# Patient Record
Sex: FEMALE | Race: WHITE | NOT HISPANIC OR LATINO | Employment: FULL TIME | URBAN - METROPOLITAN AREA
[De-identification: names, ages, dates, MRNs, and addresses within clinical notes are randomized per-mention and may not be internally consistent; named-entity substitution may affect disease eponyms.]

---

## 2017-01-30 ENCOUNTER — ALLSCRIPTS OFFICE VISIT (OUTPATIENT)
Dept: OTHER | Facility: OTHER | Age: 32
End: 2017-01-30

## 2017-10-04 ENCOUNTER — ALLSCRIPTS OFFICE VISIT (OUTPATIENT)
Dept: OTHER | Facility: OTHER | Age: 32
End: 2017-10-04

## 2017-10-06 NOTE — PROGRESS NOTES
Assessment  1  Bacterial skin infection (686 9,041 9) (L08 9,B96 89)    Plan  Bacterial skin infection    · Sulfamethoxazole-Trimethoprim 800-160 MG Oral Tablet; TAKE 1 TABLET TWICE  DAILY UNTIL FINISHED    Discussion/Summary    Start Bactrim as directed  Call or return with problems or concerns  Possible side effects of new medications were reviewed with the patient/guardian today  The treatment plan was reviewed with the patient/guardian  The patient/guardian understands and agrees with the treatment plan      Chief Complaint  RASH ON FACE AND IN RIGHT ARM PIT 3-4 WEEKS      History of Present Illness  HPI: RASH ON FACE AND IN RIGHT ARM PIT 3-4 WEEKSTO HYDROCORTISONE CREAM AND AEROSOL [facility] BURNING, FACE RASH ITCHY      Review of Systems    Constitutional: No fever, no chills, feels well, no tiredness, no recent weight gain or loss  ENT: no ear ache, no loss of hearing, no nosebleeds or nasal discharge, no sore throat or hoarseness  Cardiovascular: no complaints of slow or fast heart rate, no chest pain, no palpitations, no leg claudication or lower extremity edema  Respiratory: no complaints of shortness of breath, no wheezing, no dyspnea on exertion, no orthopnea or PND  Breasts: no complaints of breast pain, breast lump or nipple discharge  Gastrointestinal: no complaints of abdominal pain, no constipation, no nausea or diarrhea, no vomiting, no bloody stools  Genitourinary: no complaints of dysuria, no incontinence, no pelvic pain, no dysmenorrhea, no vaginal discharge or abnormal vaginal bleeding  Musculoskeletal: no complaints of arthralgia, no myalgia, no joint swelling or stiffness, no limb pain or swelling  Integumentary: rash-and-itching, but-as noted in HPI  Neurological: no complaints of headache, no confusion, no numbness or tingling, no dizziness or fainting  ROS reviewed  Active Problems  1  Anxiety (300 00) (F41 9)   2   Fatigue, unspecified type (780 79) (R53 83)   3  Iron deficiency anemia, unspecified iron deficiency anemia type (280 9) (D50 9)   4  Nausea (787 02) (R11 0)   5  Other infective acute otitis externa of left ear (380 10) (D93 736)    Past Medical History  Active Problems And Past Medical History Reviewed: The active problems and past medical history were reviewed and updated today  Family History  Mother    1  No pertinent family history  Family History Reviewed: The family history was reviewed and updated today  Social History   · Never a smoker  The social history was reviewed and updated today  Surgical History  Surgical History Reviewed: The surgical history was reviewed and updated today  Current Meds   1  ALPRAZolam 0 25 MG Oral Tablet; TAKE 1 TABLET TWICE DAILY AS NEEDED; Therapy: [id number] to (Evaluate:14Oct2017); Last Rx:[id number] Ordered   2  BusPIRone HCl - 15 MG Oral Tablet; 1 BID; Therapy: [id number] to (Last Rx:[id number])  Requested for: [id number] Ordered    The medication list was reviewed and updated today  Allergies  1  Percocet TABS    Vitals   Recorded: 55PQB6897 19:92UI   Systolic 510   Diastolic 76   Height 5 ft 1 in   Weight 122 lb    BMI Calculated 23 05   BSA Calculated 1 53     Physical Exam    Constitutional   General appearance: No acute distress, well appearing and well nourished  Pulmonary   Respiratory effort: No increased work of breathing or signs of respiratory distress  Musculoskeletal   Gait and station: Normal     Skin   Skin and subcutaneous tissue: Abnormal  -raised red small patchy rash to right cheek and chin  Folliculitis to right axilla     Psychiatric   Orientation to person, place, and time: Normal     Mood and affect: Normal          Signatures   Electronically signed by : Umu Albright; Oct  4 2017  7:09PM EST                       (Author)    Electronically signed by : Alek Rodas MD; Oct  5 2017  7:38AM EST

## 2017-11-17 ENCOUNTER — HOSPITAL ENCOUNTER (EMERGENCY)
Facility: HOSPITAL | Age: 32
Discharge: HOME | End: 2017-11-17
Attending: EMERGENCY MEDICINE | Admitting: EMERGENCY MEDICINE
Payer: COMMERCIAL

## 2017-11-17 VITALS
WEIGHT: 125 LBS | HEIGHT: 61 IN | BODY MASS INDEX: 23.6 KG/M2 | SYSTOLIC BLOOD PRESSURE: 95 MMHG | HEART RATE: 83 BPM | OXYGEN SATURATION: 100 % | RESPIRATION RATE: 18 BRPM | TEMPERATURE: 98.2 F | DIASTOLIC BLOOD PRESSURE: 51 MMHG

## 2017-11-17 DIAGNOSIS — N10 PYELONEPHRITIS, ACUTE: Primary | ICD-10-CM

## 2017-11-17 LAB
ALBUMIN SERPL BCP-MCNC: 4.1 G/DL (ref 3.5–5)
ALP SERPL-CCNC: 57 U/L (ref 46–116)
ALT SERPL W P-5'-P-CCNC: 23 U/L (ref 12–78)
ANION GAP SERPL CALCULATED.3IONS-SCNC: 10 MMOL/L (ref 4–13)
AST SERPL W P-5'-P-CCNC: 13 U/L (ref 5–45)
BACTERIA UR QL AUTO: ABNORMAL /HPF
BASOPHILS # BLD AUTO: 0.04 THOUSANDS/ΜL (ref 0–0.1)
BASOPHILS NFR BLD AUTO: 0 % (ref 0–1)
BILIRUB SERPL-MCNC: 0.2 MG/DL (ref 0.2–1)
BILIRUB UR QL STRIP: NEGATIVE
BUN SERPL-MCNC: 9 MG/DL (ref 5–25)
CALCIUM SERPL-MCNC: 8.4 MG/DL (ref 8.3–10.1)
CHLORIDE SERPL-SCNC: 104 MMOL/L (ref 100–108)
CLARITY UR: ABNORMAL
CO2 SERPL-SCNC: 26 MMOL/L (ref 21–32)
COLOR UR: YELLOW
CREAT SERPL-MCNC: 0.68 MG/DL (ref 0.6–1.3)
EOSINOPHIL # BLD AUTO: 0.07 THOUSAND/ΜL (ref 0–0.61)
EOSINOPHIL NFR BLD AUTO: 1 % (ref 0–6)
ERYTHROCYTE [DISTWIDTH] IN BLOOD BY AUTOMATED COUNT: 13.3 % (ref 11.6–15.1)
EXT PREG TEST URINE: NEGATIVE
GFR SERPL CREATININE-BSD FRML MDRD: 118 ML/MIN/1.73SQ M
GLUCOSE SERPL-MCNC: 103 MG/DL (ref 65–140)
GLUCOSE UR STRIP-MCNC: NEGATIVE MG/DL
HCT VFR BLD AUTO: 37.4 % (ref 34.8–46.1)
HGB BLD-MCNC: 12.8 G/DL (ref 11.5–15.4)
HGB UR QL STRIP.AUTO: ABNORMAL
KETONES UR STRIP-MCNC: NEGATIVE MG/DL
LEUKOCYTE ESTERASE UR QL STRIP: ABNORMAL
LYMPHOCYTES # BLD AUTO: 1.57 THOUSANDS/ΜL (ref 0.6–4.47)
LYMPHOCYTES NFR BLD AUTO: 13 % (ref 14–44)
MCH RBC QN AUTO: 30.4 PG (ref 26.8–34.3)
MCHC RBC AUTO-ENTMCNC: 34.2 G/DL (ref 31.4–37.4)
MCV RBC AUTO: 89 FL (ref 82–98)
MONOCYTES # BLD AUTO: 0.97 THOUSAND/ΜL (ref 0.17–1.22)
MONOCYTES NFR BLD AUTO: 8 % (ref 4–12)
NEUTROPHILS # BLD AUTO: 9.41 THOUSANDS/ΜL (ref 1.85–7.62)
NEUTS SEG NFR BLD AUTO: 78 % (ref 43–75)
NITRITE UR QL STRIP: NEGATIVE
NON-SQ EPI CELLS URNS QL MICRO: ABNORMAL /HPF
PH UR STRIP.AUTO: 5.5 [PH] (ref 4.5–8)
PLATELET # BLD AUTO: 240 THOUSANDS/UL (ref 149–390)
PMV BLD AUTO: 10.9 FL (ref 8.9–12.7)
POTASSIUM SERPL-SCNC: 3.3 MMOL/L (ref 3.5–5.3)
PROT SERPL-MCNC: 7.3 G/DL (ref 6.4–8.2)
PROT UR STRIP-MCNC: ABNORMAL MG/DL
RBC # BLD AUTO: 4.21 MILLION/UL (ref 3.81–5.12)
RBC #/AREA URNS AUTO: ABNORMAL /HPF
SODIUM SERPL-SCNC: 140 MMOL/L (ref 136–145)
SP GR UR STRIP.AUTO: >=1.03 (ref 1–1.03)
UROBILINOGEN UR QL STRIP.AUTO: 0.2 E.U./DL
WBC # BLD AUTO: 12.06 THOUSAND/UL (ref 4.31–10.16)
WBC #/AREA URNS AUTO: ABNORMAL /HPF

## 2017-11-17 PROCEDURE — 87077 CULTURE AEROBIC IDENTIFY: CPT | Performed by: EMERGENCY MEDICINE

## 2017-11-17 PROCEDURE — 96365 THER/PROPH/DIAG IV INF INIT: CPT

## 2017-11-17 PROCEDURE — 87086 URINE CULTURE/COLONY COUNT: CPT | Performed by: EMERGENCY MEDICINE

## 2017-11-17 PROCEDURE — 81001 URINALYSIS AUTO W/SCOPE: CPT | Performed by: EMERGENCY MEDICINE

## 2017-11-17 PROCEDURE — 87186 SC STD MICRODIL/AGAR DIL: CPT | Performed by: EMERGENCY MEDICINE

## 2017-11-17 PROCEDURE — 96375 TX/PRO/DX INJ NEW DRUG ADDON: CPT

## 2017-11-17 PROCEDURE — 81025 URINE PREGNANCY TEST: CPT | Performed by: EMERGENCY MEDICINE

## 2017-11-17 PROCEDURE — 36415 COLL VENOUS BLD VENIPUNCTURE: CPT | Performed by: EMERGENCY MEDICINE

## 2017-11-17 PROCEDURE — 80053 COMPREHEN METABOLIC PANEL: CPT | Performed by: EMERGENCY MEDICINE

## 2017-11-17 PROCEDURE — 85025 COMPLETE CBC W/AUTO DIFF WBC: CPT | Performed by: EMERGENCY MEDICINE

## 2017-11-17 PROCEDURE — 99284 EMERGENCY DEPT VISIT MOD MDM: CPT

## 2017-11-17 RX ORDER — BUSPIRONE HYDROCHLORIDE 15 MG/1
7.5 TABLET ORAL DAILY
COMMUNITY
Start: 2015-12-31 | End: 2018-03-26 | Stop reason: SDUPTHER

## 2017-11-17 RX ORDER — CIPROFLOXACIN 500 MG/1
500 TABLET, FILM COATED ORAL EVERY 12 HOURS
Qty: 20 TABLET | Refills: 0 | Status: SHIPPED | OUTPATIENT
Start: 2017-11-17 | End: 2017-11-27

## 2017-11-17 RX ORDER — ALPRAZOLAM 0.25 MG/1
0.25 TABLET ORAL 2 TIMES DAILY PRN
COMMUNITY
Start: 2015-10-23 | End: 2018-04-16 | Stop reason: SDUPTHER

## 2017-11-17 RX ORDER — KETOROLAC TROMETHAMINE 30 MG/ML
30 INJECTION, SOLUTION INTRAMUSCULAR; INTRAVENOUS ONCE
Status: COMPLETED | OUTPATIENT
Start: 2017-11-17 | End: 2017-11-17

## 2017-11-17 RX ORDER — CIPROFLOXACIN 2 MG/ML
400 INJECTION, SOLUTION INTRAVENOUS ONCE
Status: COMPLETED | OUTPATIENT
Start: 2017-11-17 | End: 2017-11-17

## 2017-11-17 RX ORDER — ONDANSETRON 2 MG/ML
4 INJECTION INTRAMUSCULAR; INTRAVENOUS ONCE
Status: COMPLETED | OUTPATIENT
Start: 2017-11-17 | End: 2017-11-17

## 2017-11-17 RX ADMIN — SODIUM CHLORIDE 1000 ML: 0.9 INJECTION, SOLUTION INTRAVENOUS at 02:20

## 2017-11-17 RX ADMIN — CIPROFLOXACIN 400 MG: 2 INJECTION, SOLUTION INTRAVENOUS at 02:30

## 2017-11-17 RX ADMIN — KETOROLAC TROMETHAMINE 30 MG: 30 INJECTION, SOLUTION INTRAMUSCULAR at 02:27

## 2017-11-17 RX ADMIN — ONDANSETRON 4 MG: 2 INJECTION INTRAMUSCULAR; INTRAVENOUS at 02:23

## 2017-11-17 NOTE — ED PROVIDER NOTES
History  Chief Complaint   Patient presents with    Flank Pain     Presents with C/O left flank since 10 pm last night  Being treated for UTI for 2 weeks  No history of renal calculi  This is a 29-year-old female complaining of 2 weeks of urinary frequency burning and suprapubic pain and then tonight about 3 hours ago she developed left flank pain similar to when she had a kidney infection in the past   She actually did 5 day course of Bactrim for this recent UTI but that was without any urine sent sample for urinalysis  She describes this left flank pain as a constant ache which is worse with movement  She does not recall any injuries  She did not take any pain medications  No fever or chills  She does have some slight nausea  No diarrhea or constipation  No vomiting  Prior to Admission Medications   Prescriptions Last Dose Informant Patient Reported? Taking? ALPRAZolam (XANAX) 0 25 mg tablet   Yes Yes   Sig: Take 0 25 mg by mouth 2 (two) times a day as needed   busPIRone (BUSPAR) 15 mg tablet   Yes Yes   Sig: Take 7 5 mg by mouth daily      Facility-Administered Medications: None       History reviewed  No pertinent past medical history  Past Surgical History:   Procedure Laterality Date    WISDOM TOOTH EXTRACTION         History reviewed  No pertinent family history  I have reviewed and agree with the history as documented  Social History   Substance Use Topics    Smoking status: Never Smoker    Smokeless tobacco: Never Used    Alcohol use No        Review of Systems   All other systems reviewed and are negative        Physical Exam  ED Triage Vitals [11/17/17 0140]   Temperature Pulse Respirations Blood Pressure SpO2   97 8 °F (36 6 °C) 86 20 123/70 100 %      Temp Source Heart Rate Source Patient Position - Orthostatic VS BP Location FiO2 (%)   Tympanic Monitor Sitting Right arm --      Pain Score       Worst Possible Pain           Orthostatic Vital Signs  Vitals: 11/17/17 0300 11/17/17 0315 11/17/17 0330 11/17/17 0345   BP: 105/62  95/51    Pulse: 73 74 75 83   Patient Position - Orthostatic VS: Lying  Lying        Physical Exam   Constitutional: She is oriented to person, place, and time  She appears well-developed and well-nourished  She appears distressed  HENT:   Mouth/Throat: Oropharynx is clear and moist    Eyes: Conjunctivae and EOM are normal  Pupils are equal, round, and reactive to light  Neck: Normal range of motion  Neck supple  No spinous process tenderness present  Cardiovascular: Normal rate, regular rhythm, normal heart sounds and intact distal pulses  Pulmonary/Chest: Effort normal and breath sounds normal  No respiratory distress  She has no wheezes  Abdominal: Soft  Bowel sounds are normal  She exhibits no distension  There is generalized tenderness  There is CVA tenderness  There is no rebound and no guarding  Musculoskeletal: Normal range of motion  Neurological: She is alert and oriented to person, place, and time  She has normal strength  No sensory deficit  GCS eye subscore is 4  GCS verbal subscore is 5  GCS motor subscore is 6  Skin: Skin is warm and dry  No rash noted  Psychiatric: She has a normal mood and affect  Nursing note and vitals reviewed        ED Medications  Medications   ketorolac (TORADOL) 30 mg/mL injection 30 mg (30 mg Intravenous Given 11/17/17 0227)   ondansetron (ZOFRAN) injection 4 mg (4 mg Intravenous Given 11/17/17 0223)   sodium chloride 0 9 % bolus 1,000 mL (0 mL Intravenous Stopped 11/17/17 0335)   ciprofloxacin (CIPRO) IVPB (premix) 400 mg (0 mg Intravenous Stopped 11/17/17 0335)       Diagnostic Studies  Results Reviewed     Procedure Component Value Units Date/Time    Urine Microscopic [[id number]]  (Abnormal) Collected:  11/17/17 0155    Lab Status:  Final result Specimen:  Urine from Urine, Clean Catch Updated:  11/17/17 0254     RBC, UA 20-30 (A) /hpf      WBC, UA 10-20 (A) /hpf      Epithelial Cells Occasional /hpf      Bacteria, UA Moderate (A) /hpf     Urine culture [[id number]] Collected:  11/17/17 0155    Lab Status: In process Specimen:  Urine from Urine, Clean Catch Updated:  11/17/17 0254    CMP [[id number]]  (Abnormal) Collected:  11/17/17 0210    Lab Status:  Final result Specimen:  Blood from Line, Venous Updated:  11/17/17 0243     Sodium 140 mmol/L      Potassium 3 3 (L) mmol/L      Chloride 104 mmol/L      CO2 26 mmol/L      Anion Gap 10 mmol/L      BUN 9 mg/dL      Creatinine 0 68 mg/dL      Glucose 103 mg/dL      Calcium 8 4 mg/dL      AST 13 U/L      ALT 23 U/L      Alkaline Phosphatase 57 U/L      Total Protein 7 3 g/dL      Albumin 4 1 g/dL      Total Bilirubin 0 20 mg/dL      eGFR 118 ml/min/1 73sq m     Narrative:         National Kidney Disease Education Program recommendations are as follows:  GFR calculation is accurate only with a steady state creatinine  Chronic Kidney disease less than 60 ml/min/1 73 sq  meters  Kidney failure less than 15 ml/min/1 73 sq  meters      UA w Reflex to Microscopic w Reflex to Culture [[id number]]  (Abnormal) Collected:  11/17/17 0155    Lab Status:  Final result Specimen:  Urine from Urine, Clean Catch Updated:  11/17/17 0234     Color, UA Yellow     Clarity, UA Turbid     Specific Gravity, UA >=1 030     pH, UA 5 5     Leukocytes, UA Moderate (A)     Nitrite, UA Negative     Protein,  (2+) (A) mg/dl      Glucose, UA Negative mg/dl      Ketones, UA Negative mg/dl      Urobilinogen, UA 0 2 E U /dl      Bilirubin, UA Negative     Blood, UA Large (A)    CBC and differential [[id number]]  (Abnormal) Collected:  11/17/17 0210    Lab Status:  Final result Specimen:  Blood from Line, Venous Updated:  11/17/17 0234     WBC 12 06 (H) Thousand/uL      RBC 4 21 Million/uL      Hemoglobin 12 8 g/dL      Hematocrit 37 4 %      MCV 89 fL      MCH 30 4 pg      MCHC 34 2 g/dL      RDW 13 3 %      MPV 10 9 fL      Platelets 676 Thousands/uL      Neutrophils Relative 78 (H) % Lymphocytes Relative 13 (L) %      Monocytes Relative 8 %      Eosinophils Relative 1 %      Basophils Relative 0 %      Neutrophils Absolute 9 41 (H) Thousands/µL      Lymphocytes Absolute 1 57 Thousands/µL      Monocytes Absolute 0 97 Thousand/µL      Eosinophils Absolute 0 07 Thousand/µL      Basophils Absolute 0 04 Thousands/µL     POCT pregnancy, urine [[id number]]  (Normal) Resulted:  11/17/17 0155    Lab Status:  Final result Updated:  11/17/17 0201     EXT PREG TEST UR (Ref: Negative) negative                 No orders to display              Procedures  Procedures       Phone Contacts  ED Phone Contact    ED Course  ED Course                                MDM  Number of Diagnoses or Management Options  Pyelonephritis, acute: new and requires workup     Amount and/or Complexity of Data Reviewed  Clinical lab tests: ordered and reviewed    Patient Progress  Patient progress: improved    CritCare Time    Disposition  Final diagnoses:   Pyelonephritis, acute - left side     Time reflects when diagnosis was documented in both MDM as applicable and the Disposition within this note     Time User Action Codes Description Comment    11/17/2017  3:10 AM Ezra Allen Add [N10] Pyelonephritis, acute     11/17/2017  3:10 AM Ezra Allen Modify [N10] Pyelonephritis, acute left side      ED Disposition     ED Disposition Condition Comment    Discharge  Shadia Dalton discharge to home/self care  Condition at discharge: Good        Follow-up Information     Follow up With Specialties Details Why Contact Info    Jose Adhikari MD Family Medicine  As needed [address] [facility]  [address] [address]  [address]          Discharge Medication List as of 11/17/2017  3:11 AM      START taking these medications    Details   ciprofloxacin (CIPRO) 500 mg tablet Take 1 tablet by mouth every 12 (twelve) hours for 10 days, Starting Fri 11/17/2017, Until Mon 11/27/2017, Print           No discharge procedures on file      ED Provider  Electronically Signed by           Edith España DO  11/17/17 94

## 2017-11-17 NOTE — DISCHARGE INSTRUCTIONS
Kidney Infection   WHAT YOU NEED TO KNOW:   A kidney infection, or pyelonephritis, is a bacterial infection  The infection usually starts in your bladder or urethra and moves into your kidney  One or both kidneys may be infected  DISCHARGE INSTRUCTIONS:   Seek care immediately if:   · You have a fever and chills  · You cannot stop vomiting  · You have severe pain in your abdomen, lower back, or sides  Contact your healthcare provider if:   · You continue to have a fever after you take antibiotics for 3 days  · You have pain when you urinate, even after treatment  · Your signs and symptoms return  · You have questions or concerns about your condition or care  Medicines: You may  need any of the following:  · Antibiotics  treat your bacterial infection  · Acetaminophen  decreases pain and fever  It is available without a doctor's order  Ask how much to take and how often to take it  Follow directions  Read the labels of all other medicines you are using to see if they also contain acetaminophen, or ask your doctor or pharmacist  Acetaminophen can cause liver damage if not taken correctly  Do not use more than 4 grams (4,000 milligrams) total of acetaminophen in one day  · NSAIDs , such as ibuprofen, help decrease swelling, pain, and fever  This medicine is available with or without a doctor's order  NSAIDs can cause stomach bleeding or kidney problems in certain people  If you take blood thinner medicine, always ask if NSAIDs are safe for you  Always read the medicine label and follow directions  Do not give these medicines to children under 10months of age without direction from your child's healthcare provider  · Prescription pain medicine  may be given  Ask how to take this medicine safely  · Take your medicine as directed  Contact your healthcare provider if you think your medicine is not helping or if you have side effects   Tell him of her if you are allergic to any medicine  Keep a list of the medicines, vitamins, and herbs you take  Include the amounts, and when and why you take them  Bring the list or the pill bottles to follow-up visits  Carry your medicine list with you in case of an emergency  Drink liquids as directed: You may need to drink extra liquids to help flush your kidneys and urinary system  Water is the best liquid to drink  Ask your healthcare provider how much liquid to drink each day and which liquids are best for you  Urinate as soon as you feel the urge: This will help flush bacteria from your urinary system  Do not wait or hold your urine for too long  Clean your genital area every day with soap and water:  Wipe from front to back after you urinate or have a bowel movement  Wear cotton underwear  Fabrics such as nylon and polyester can stay damp  This can increase your risk for infection  Urinate within 15 minutes after you have sex  Follow up with your healthcare provider as directed:  Write down your questions so you remember to ask them during your visits  © 2017 [facility] [place] Information is for End User's use only and may not be sold, redistributed or otherwise used for commercial purposes  All illustrations and images included in CareNotes® are the copyrighted property of A D A M , Inc  or Eldon Rodriguez  The above information is an  only  It is not intended as medical advice for individual conditions or treatments  Talk to your doctor, nurse or pharmacist before following any medical regimen to see if it is safe and effective for you

## 2017-11-19 LAB — BACTERIA UR CULT: ABNORMAL

## 2018-01-09 ENCOUNTER — GENERIC CONVERSION - ENCOUNTER (OUTPATIENT)
Dept: FAMILY MEDICINE CLINIC | Facility: CLINIC | Age: 33
End: 2018-01-09

## 2018-01-10 NOTE — RESULT NOTES
Verified Results  (1) COMPREHENSIVE METABOLIC PANEL [id number] 02:09AO [facility]     Test Name Result Flag Reference   Glucose, Serum 92 mg/dL  65-99   BUN 12 mg/dL  6-20   Creatinine, Serum 0 64 mg/dL  0 57-1 00   eGFR If NonAfricn Am 122 mL/min/1 73  >59   eGFR If Africn Am 140 mL/min/1 73  >59   BUN/Creatinine Ratio 19  8-20   Sodium, Serum 139 mmol/L  134-144   Potassium, Serum 4 0 mmol/L  3 5-5 2   Chloride, Serum 100 mmol/L     Carbon Dioxide, Total 21 mmol/L  18-29   Calcium, Serum 9 0 mg/dL  8 7-10 2   Protein, Total, Serum 7 6 g/dL  6 0-8 5   Albumin, Serum 5 0 g/dL  3 5-5 5   Globulin, Total 2 6 g/dL  1 5-4 5   A/G Ratio 1 9  1 1-2 5   Bilirubin, Total 0 4 mg/dL  0 0-1 2   Alkaline Phosphatase, S 48 IU/L     AST (SGOT) 13 IU/L  0-40   ALT (SGPT) 12 IU/L  0-32     (1) TSH [id number] 08:47AM [employer]     Test Name Result Flag Reference   TSH 3 690 uIU/mL  0 450-4 500     (1) FERRITIN [id number] 08:47AM [place] [facility]     Test Name Result Flag Reference   Ferritin, Serum 58 ng/mL       (1) CBC/PLT/DIFF [id number] 08:47AM Viola Nelly     Test Name Result Flag Reference   WBC 4 6 x10E3/uL  3 4-10 8   RBC 4 81 x10E6/uL  3 77-5 28   Hemoglobin 14 5 g/dL  11 1-15 9   Hematocrit 42 5 %  34 0-46  6   MCV 88 fL  79-97   MCH 30 1 pg  26 6-33 0   MCHC 34 1 g/dL  31 5-35 7   RDW 14 4 %  12 3-15 4   Platelets 229 E25F2/IC  150-379   Neutrophils 60 %     Lymphs 31 %     Monocytes 7 %     Eos 1 %     Basos 1 %     Neutrophils (Absolute) 2 8 x10E3/uL  1 4-7 0   Lymphs (Absolute) 1 4 x10E3/uL  0 7-3 1   Monocytes(Absolute) 0 3 x10E3/uL  0 1-0 9   Eos (Absolute) 0 1 x10E3/uL  0 0-0 4   Baso (Absolute) 0 0 x10E3/uL  0 0-0 2   Immature Granulocytes 0 %     Immature Grans (Abs) 0 0 x10E3/uL  0 0-0 1

## 2018-01-13 VITALS
DIASTOLIC BLOOD PRESSURE: 60 MMHG | BODY MASS INDEX: 23.98 KG/M2 | WEIGHT: 127 LBS | HEIGHT: 61 IN | SYSTOLIC BLOOD PRESSURE: 112 MMHG | OXYGEN SATURATION: 99 % | HEART RATE: 76 BPM

## 2018-01-14 VITALS
WEIGHT: 122 LBS | BODY MASS INDEX: 23.03 KG/M2 | SYSTOLIC BLOOD PRESSURE: 112 MMHG | DIASTOLIC BLOOD PRESSURE: 76 MMHG | HEIGHT: 61 IN

## 2018-03-26 DIAGNOSIS — F41.9 ANXIETY: Primary | ICD-10-CM

## 2018-03-26 RX ORDER — BUSPIRONE HYDROCHLORIDE 15 MG/1
15 TABLET ORAL 2 TIMES DAILY
Qty: 60 TABLET | Refills: 0 | Status: SHIPPED | OUTPATIENT
Start: 2018-03-26 | End: 2018-04-16 | Stop reason: SDUPTHER

## 2018-04-16 ENCOUNTER — OFFICE VISIT (OUTPATIENT)
Dept: FAMILY MEDICINE CLINIC | Facility: CLINIC | Age: 33
End: 2018-04-16
Payer: COMMERCIAL

## 2018-04-16 VITALS
WEIGHT: 122 LBS | BODY MASS INDEX: 22.45 KG/M2 | DIASTOLIC BLOOD PRESSURE: 70 MMHG | SYSTOLIC BLOOD PRESSURE: 118 MMHG | HEART RATE: 76 BPM | HEIGHT: 62 IN

## 2018-04-16 DIAGNOSIS — F41.9 ANXIETY: Primary | ICD-10-CM

## 2018-04-16 DIAGNOSIS — Z00.00 WELLNESS EXAMINATION: ICD-10-CM

## 2018-04-16 PROCEDURE — 99395 PREV VISIT EST AGE 18-39: CPT | Performed by: FAMILY MEDICINE

## 2018-04-16 PROCEDURE — 99213 OFFICE O/P EST LOW 20 MIN: CPT | Performed by: FAMILY MEDICINE

## 2018-04-16 PROCEDURE — 1036F TOBACCO NON-USER: CPT | Performed by: FAMILY MEDICINE

## 2018-04-16 PROCEDURE — 3008F BODY MASS INDEX DOCD: CPT | Performed by: FAMILY MEDICINE

## 2018-04-16 RX ORDER — BUSPIRONE HYDROCHLORIDE 15 MG/1
15 TABLET ORAL 2 TIMES DAILY
Qty: 180 TABLET | Refills: 3 | Status: SHIPPED | OUTPATIENT
Start: 2018-04-16 | End: 2019-03-18 | Stop reason: SDUPTHER

## 2018-04-16 RX ORDER — ALPRAZOLAM 0.25 MG/1
0.25 TABLET ORAL 2 TIMES DAILY PRN
Qty: 30 TABLET | Refills: 5 | Status: SHIPPED | OUTPATIENT
Start: 2018-04-16 | End: 2019-03-18 | Stop reason: SDUPTHER

## 2018-04-16 NOTE — PROGRESS NOTES
[address] [facility]        NAME: Astrid Leiva is a 29 y o  female  : 1985    MRN: [id number]  DATE: 2018  TIME: 7:36 AM    Assessment and Plan   Anxiety [F41 9]  1  Anxiety  busPIRone (BUSPAR) 15 mg tablet    ALPRAZolam (XANAX) 0 25 mg tablet         Patient Instructions     There are no Patient Instructions on file for this visit  Chief Complaint     Chief Complaint   Patient presents with    Follow-up     Flori Valenzuela         History of Present Illness       anx--stable        Review of Systems   Review of Systems   Constitutional: Negative for appetite change, chills, diaphoresis and fever  HENT: Negative for ear pain, rhinorrhea, sinus pressure and sore throat  Eyes: Negative for discharge, redness and itching  Respiratory: Negative for cough, shortness of breath and wheezing  Cardiovascular: Negative for chest pain and palpitations  Rapid or slow heart rate   Gastrointestinal: Negative for abdominal pain, diarrhea, nausea and vomiting           Current Medications       Current Outpatient Prescriptions:     ALPRAZolam (XANAX) 0 25 mg tablet, Take 1 tablet (0 25 mg total) by mouth 2 (two) times a day as needed for anxiety, Disp: 30 tablet, Rfl: 5    busPIRone (BUSPAR) 15 mg tablet, Take 1 tablet (15 mg total) by mouth 2 (two) times a day, Disp: 180 tablet, Rfl: 3    Current Allergies     Allergies as of 2018 - Reviewed 2018   Allergen Reaction Noted    Oxycodone-acetaminophen  2016    Vicodin [hydrocodone-acetaminophen] Rash 2017            The following portions of the patient's history were reviewed and updated as appropriate: allergies, current medications, past family history, past medical history, past social history, past surgical history and problem list      Past Medical History:   Diagnosis Date    Anxiety        Past Surgical History:   Procedure Laterality Date    WISDOM TOOTH EXTRACTION Family History   Problem Relation Age of Onset    No Known Problems Mother          Medications have been verified  Objective   /70   Pulse 76   Ht 5' 2" (1 575 m)   Wt 55 3 kg (122 lb)   LMP  (LMP Unknown) Comment: MIRENA  BMI 22 31 kg/m²        Physical Exam     Physical Exam   Constitutional: She appears well-developed and well-nourished  HENT:   Right Ear: Tympanic membrane, external ear and ear canal normal  Tympanic membrane is not injected  Left Ear: Tympanic membrane, external ear and ear canal normal  Tympanic membrane is not injected  Nose: Nose normal    Mouth/Throat: Oropharynx is clear and moist and mucous membranes are normal    Eyes: Conjunctivae are normal  Pupils are equal, round, and reactive to light  Neck: Normal range of motion  Neck supple  No thyromegaly present  Cardiovascular: Normal rate, regular rhythm, normal heart sounds and intact distal pulses  Pulmonary/Chest: Effort normal and breath sounds normal  She has no wheezes  Nursing note and vitals reviewed

## 2018-04-16 NOTE — PROGRESS NOTES
HPI:  Renetta De Leon is a 29 y o  female here for her yearly health maintenance exam    Patient Active Problem List   Diagnosis    Anxiety     Past Medical History:   Diagnosis Date    Anxiety        1  Advanced Directive: n     2  Durable Power of  for Healthcare: n     3  Social History:           Drug and alcohol History: n                  4  General Health: good              Immunizations up to date:y                 Lifestyle:                           Healthy Diety                          Alcohol Use:n                          Tobacco Use:n                          Regular exercise:y                          Weight concerns:n                               5  Over the past 2 weeks, how often have you been bothered by the following:              Little interest or pleasure in doing things:n              Felling down, depressed or hopeless:n       Current Outpatient Prescriptions   Medication Sig Dispense Refill    ALPRAZolam (XANAX) 0 25 mg tablet Take 1 tablet (0 25 mg total) by mouth 2 (two) times a day as needed for anxiety 30 tablet 5    busPIRone (BUSPAR) 15 mg tablet Take 1 tablet (15 mg total) by mouth 2 (two) times a day 180 tablet 3     No current facility-administered medications for this visit  Allergies   Allergen Reactions    Oxycodone-Acetaminophen     Vicodin [Hydrocodone-Acetaminophen] Rash     Immunization History   Administered Date(s) Administered    Hep B, adult 06/30/1998, 08/20/1998, 08/27/1999    Tdap 06/16/2011       Patient Care Team:  Ludmila Jones MD as PCP - General      Physical Exam :  Physical Exam     Reviewed Updated [facility] Prior Wellness Visits:   Last Health Maintenance visit information was reviewed, patient interviewed , no change since last HM visit yes  Last HM visit information was reviewed, patient interviewed and updates made to the record today yes    Assessment and Plan:  1   Anxiety  busPIRone (BUSPAR) 15 mg tablet    ALPRAZolam Shayne Bernal 0 25 mg tablet       Health Maintenance Due   Topic Date Due    HIV SCREENING  1985    Depression Screening PHQ-9  08/26/1999    INFLUENZA VACCINE  09/01/2017

## 2019-03-18 ENCOUNTER — OFFICE VISIT (OUTPATIENT)
Dept: FAMILY MEDICINE CLINIC | Facility: CLINIC | Age: 34
End: 2019-03-18

## 2019-03-18 VITALS
HEIGHT: 62 IN | HEART RATE: 88 BPM | SYSTOLIC BLOOD PRESSURE: 116 MMHG | DIASTOLIC BLOOD PRESSURE: 72 MMHG | WEIGHT: 124 LBS | OXYGEN SATURATION: 98 % | BODY MASS INDEX: 22.82 KG/M2

## 2019-03-18 DIAGNOSIS — F41.9 ANXIETY: ICD-10-CM

## 2019-03-18 DIAGNOSIS — Z00.00 WELL ADULT EXAM: Primary | ICD-10-CM

## 2019-03-18 PROCEDURE — 99213 OFFICE O/P EST LOW 20 MIN: CPT | Performed by: NURSE PRACTITIONER

## 2019-03-18 RX ORDER — ALPRAZOLAM 0.25 MG/1
0.25 TABLET ORAL 2 TIMES DAILY PRN
Qty: 30 TABLET | Refills: 5 | Status: SHIPPED | OUTPATIENT
Start: 2019-03-18 | End: 2020-04-08 | Stop reason: SDUPTHER

## 2019-03-18 RX ORDER — BUSPIRONE HYDROCHLORIDE 15 MG/1
15 TABLET ORAL 2 TIMES DAILY
Qty: 180 TABLET | Refills: 3 | Status: SHIPPED | OUTPATIENT
Start: 2019-03-18 | End: 2019-12-18 | Stop reason: ALTCHOICE

## 2019-03-18 NOTE — PROGRESS NOTES
[facility]        NAME: Janak Rose is a 34 y o  female  : 1985    MRN: [id number]  DATE: 2019  TIME: 2:27 PM    Assessment and Plan   Well adult exam [Z00 00]  1  Well adult exam     2  Anxiety  ALPRAZolam (XANAX) 0 25 mg tablet    busPIRone (BUSPAR) 15 mg tablet         Patient Instructions     Patient Instructions   Work Physical form completed  Buspar and Xanax refilled-take as directed  Call/return with any problems or concerns          Chief Complaint     Chief Complaint   Patient presents with    Physical Exam         History of Present Illness         Here for work PE, and needs medication refill for Buspar and Xanax  No issues or complaints  Review of Systems   Review of Systems   Constitutional: Negative for activity change, diaphoresis, fatigue and fever  HENT: Negative for congestion, facial swelling, hearing loss, rhinorrhea, sinus pressure, sinus pain, sneezing, sore throat and voice change  Eyes: Negative for discharge and visual disturbance  Respiratory: Negative for cough, choking, chest tightness, shortness of breath, wheezing and stridor  Cardiovascular: Negative for chest pain, palpitations and leg swelling  Gastrointestinal: Negative for abdominal distention, abdominal pain, constipation, diarrhea, nausea and vomiting  Endocrine: Negative for polydipsia, polyphagia and polyuria  Genitourinary: Negative for difficulty urinating, dysuria, frequency and urgency  Musculoskeletal: Negative for arthralgias, back pain, gait problem, joint swelling, myalgias, neck pain and neck stiffness  Skin: Negative for color change, rash and wound  Neurological: Negative for dizziness, syncope, speech difficulty, weakness, light-headedness and headaches  Hematological: Negative for adenopathy  Does not bruise/bleed easily     Psychiatric/Behavioral: Negative for agitation, behavioral problems, confusion, hallucinations, sleep disturbance and suicidal ideas  The patient is not nervous/anxious  Current Medications       Current Outpatient Medications:     ALPRAZolam (XANAX) 0 25 mg tablet, Take 1 tablet (0 25 mg total) by mouth 2 (two) times a day as needed for anxiety, Disp: 30 tablet, Rfl: 5    busPIRone (BUSPAR) 15 mg tablet, Take 1 tablet (15 mg total) by mouth 2 (two) times a day, Disp: 180 tablet, Rfl: 3    Current Allergies     Allergies as of 03/18/2019 - Reviewed 03/18/2019   Allergen Reaction Noted    Oxycodone-acetaminophen  03/03/2016    Vicodin [hydrocodone-acetaminophen] Rash 11/17/2017            The following portions of the patient's history were reviewed and updated as appropriate: allergies, current medications, past family history, past medical history, past social history, past surgical history and problem list      Past Medical History:   Diagnosis Date    Anxiety        Past Surgical History:   Procedure Laterality Date    WISDOM TOOTH EXTRACTION         Family History   Problem Relation Age of Onset    No Known Problems Mother          Medications have been verified  Objective   /72   Pulse 88   Ht 5' 2" (1 575 m)   Wt 56 2 kg (124 lb)   SpO2 98%   BMI 22 68 kg/m²        Physical Exam     Physical Exam   Constitutional: She is oriented to person, place, and time  She appears well-developed and well-nourished  No distress  HENT:   Head: Normocephalic and atraumatic  Right Ear: Tympanic membrane, external ear and ear canal normal    Left Ear: Tympanic membrane, external ear and ear canal normal    Nose: Nose normal  Right sinus exhibits no maxillary sinus tenderness and no frontal sinus tenderness  Left sinus exhibits no maxillary sinus tenderness and no frontal sinus tenderness  Mouth/Throat: Uvula is midline, oropharynx is clear and moist and mucous membranes are normal  No oropharyngeal exudate  Eyes: Pupils are equal, round, and reactive to light   Conjunctivae and EOM are normal  Right eye exhibits no discharge  Left eye exhibits no discharge  Neck: Normal range of motion  Neck supple  No tracheal deviation present  No thyromegaly present  Cardiovascular: Normal rate, regular rhythm and normal heart sounds  Exam reveals no gallop and no friction rub  No murmur heard  Pulmonary/Chest: Effort normal and breath sounds normal  No respiratory distress  She has no wheezes  She has no rales  Abdominal: Soft  Bowel sounds are normal  She exhibits no distension and no mass  There is no tenderness  There is no rebound and no guarding  Musculoskeletal: Normal range of motion  She exhibits no edema, tenderness or deformity  Lymphadenopathy:     She has no cervical adenopathy  Neurological: She is alert and oriented to person, place, and time  No cranial nerve deficit  Coordination normal    Skin: Skin is warm, dry and intact  No rash noted  She is not diaphoretic  No erythema  Psychiatric: She has a normal mood and affect  Her speech is normal and behavior is normal  Judgment and thought content normal  Cognition and memory are normal    Nursing note and vitals reviewed

## 2019-03-18 NOTE — PATIENT INSTRUCTIONS
Work Physical form completed  Buspar and Xanax refilled-take as directed  Call/return with any problems or concerns

## 2019-07-03 DIAGNOSIS — F41.9 ANXIETY: ICD-10-CM

## 2019-07-03 RX ORDER — ALPRAZOLAM 0.25 MG/1
0.25 TABLET ORAL 2 TIMES DAILY PRN
Qty: 30 TABLET | Refills: 5 | Status: CANCELLED | OUTPATIENT
Start: 2019-07-03

## 2019-07-22 ENCOUNTER — TELEPHONE (OUTPATIENT)
Dept: FAMILY MEDICINE CLINIC | Facility: CLINIC | Age: 34
End: 2019-07-22

## 2019-07-22 DIAGNOSIS — G43.901 MIGRAINE WITH STATUS MIGRAINOSUS, NOT INTRACTABLE, UNSPECIFIED MIGRAINE TYPE: Primary | ICD-10-CM

## 2019-07-23 RX ORDER — SUMATRIPTAN 100 MG/1
100 TABLET, FILM COATED ORAL ONCE AS NEEDED
Qty: 12 TABLET | Refills: 5 | Status: SHIPPED | OUTPATIENT
Start: 2019-07-23 | End: 2020-10-23 | Stop reason: DRUGHIGH

## 2019-10-01 ENCOUNTER — TELEPHONE (OUTPATIENT)
Dept: FAMILY MEDICINE CLINIC | Facility: CLINIC | Age: 34
End: 2019-10-01

## 2019-10-01 DIAGNOSIS — R11.2 NAUSEA AND VOMITING, INTRACTABILITY OF VOMITING NOT SPECIFIED, UNSPECIFIED VOMITING TYPE: Primary | ICD-10-CM

## 2019-10-01 RX ORDER — ONDANSETRON 4 MG/1
4 TABLET, FILM COATED ORAL EVERY 8 HOURS PRN
Qty: 20 TABLET | Refills: 0 | Status: SHIPPED | OUTPATIENT
Start: 2019-10-01 | End: 2019-12-18 | Stop reason: ALTCHOICE

## 2019-12-18 ENCOUNTER — OFFICE VISIT (OUTPATIENT)
Dept: FAMILY MEDICINE CLINIC | Facility: CLINIC | Age: 34
End: 2019-12-18

## 2019-12-18 VITALS
OXYGEN SATURATION: 98 % | TEMPERATURE: 97.9 F | WEIGHT: 126 LBS | HEIGHT: 64 IN | HEART RATE: 73 BPM | BODY MASS INDEX: 21.51 KG/M2

## 2019-12-18 DIAGNOSIS — J20.8 ACUTE BRONCHITIS DUE TO OTHER SPECIFIED ORGANISMS: Primary | ICD-10-CM

## 2019-12-18 PROCEDURE — 99213 OFFICE O/P EST LOW 20 MIN: CPT | Performed by: FAMILY MEDICINE

## 2019-12-18 PROCEDURE — 1036F TOBACCO NON-USER: CPT | Performed by: FAMILY MEDICINE

## 2019-12-18 PROCEDURE — 3008F BODY MASS INDEX DOCD: CPT | Performed by: FAMILY MEDICINE

## 2019-12-18 RX ORDER — BENZONATATE 200 MG/1
200 CAPSULE ORAL 3 TIMES DAILY PRN
Qty: 20 CAPSULE | Refills: 0 | Status: SHIPPED | OUTPATIENT
Start: 2019-12-18 | End: 2021-04-26 | Stop reason: ALTCHOICE

## 2019-12-18 RX ORDER — PREDNISONE 20 MG/1
TABLET ORAL
Qty: 15 TABLET | Refills: 0 | Status: SHIPPED | OUTPATIENT
Start: 2019-12-18 | End: 2021-04-26 | Stop reason: ALTCHOICE

## 2019-12-18 NOTE — PROGRESS NOTES
[address]        NAME: Zoie Mejia is a 30 y o  female  : 1985    MRN: [id number]  DATE: 2019  TIME: 10:10 AM    Assessment and Plan   Acute bronchitis due to other specified organisms [J20 8]  1  Acute bronchitis due to other specified organisms  predniSONE 20 mg tablet    benzonatate (TESSALON) 200 MG capsule       No problem-specific Assessment & Plan notes found for this encounter  Patient Instructions     Patient Instructions   This appears to be a viral respiratory infection/bronchitis  The cough may persist for another 7-10 days  Medications as prescribed  Chief Complaint     Chief Complaint   Patient presents with    Cold Like Symptoms     for 1 week     Cough    Sore Throat         History of Present Illness       Patient has been ill over the last 1 week  Initially had sore throat and strep test was checked at work which was negative  Persist with mostly nonproductive cough  No fevers or sweats are reported  Review of Systems   Review of Systems   Constitutional: Negative for appetite change, chills, diaphoresis and fever  HENT: Positive for congestion  Negative for ear pain, rhinorrhea, sinus pressure and sore throat  Eyes: Negative for discharge, redness and itching  Respiratory: Positive for cough  Negative for shortness of breath and wheezing  Cardiovascular: Negative for chest pain and palpitations          Rapid or slow heart rate         Current Medications       Current Outpatient Medications:     ALPRAZolam (XANAX) 0 25 mg tablet, Take 1 tablet (0 25 mg total) by mouth 2 (two) times a day as needed for anxiety, Disp: 30 tablet, Rfl: 5    SUMAtriptan (IMITREX) 100 mg tablet, Take 1 tablet (100 mg total) by mouth once as needed for migraine for up to 1 dose, Disp: 12 tablet, Rfl: 5    benzonatate (TESSALON) 200 MG capsule, Take 1 capsule (200 mg total) by mouth 3 (three) times a day as needed for cough, Disp: 20 capsule, Rfl: 0    predniSONE 20 mg tablet, 1 tab twice daily for 5 days, then 1 tab daily for 5 days  , Disp: 15 tablet, Rfl: 0    Current Allergies     Allergies as of 12/18/2019 - Reviewed 12/18/2019   Allergen Reaction Noted    Oxycodone-acetaminophen  03/03/2016    Vicodin [hydrocodone-acetaminophen] Rash 11/17/2017            The following portions of the patient's history were reviewed and updated as appropriate: allergies, current medications, past family history, past medical history, past social history, past surgical history and problem list      Past Medical History:   Diagnosis Date    Anxiety        Past Surgical History:   Procedure Laterality Date    WISDOM TOOTH EXTRACTION         Family History   Problem Relation Age of Onset    No Known Problems Mother          Medications have been verified  Objective   Pulse 73   Temp 97 9 °F (36 6 °C)   Ht 5' 4" (1 626 m)   Wt 57 2 kg (126 lb)   SpO2 98%   BMI 21 63 kg/m²        Physical Exam     Physical Exam   Constitutional: She appears well-developed and well-nourished  No distress  HENT:   Head: Normocephalic and atraumatic  Right Ear: Tympanic membrane and external ear normal  No drainage  Left Ear: Tympanic membrane normal  No drainage  Nose: Rhinorrhea present  Right sinus exhibits no maxillary sinus tenderness  Left sinus exhibits no maxillary sinus tenderness  Mouth/Throat: No oropharyngeal exudate  Eyes: Conjunctivae and EOM are normal  Right eye exhibits no discharge  Left eye exhibits no discharge  Neck: Normal range of motion  Neck supple  No thyromegaly present  Cardiovascular: Normal rate, regular rhythm and normal heart sounds  Pulmonary/Chest: Effort normal  No respiratory distress  She has no wheezes  She has no rales  Lymphadenopathy:     She has no cervical adenopathy  Skin: Skin is warm and dry

## 2019-12-18 NOTE — PATIENT INSTRUCTIONS
This appears to be a viral respiratory infection/bronchitis  The cough may persist for another 7-10 days  Medications as prescribed

## 2020-01-13 ENCOUNTER — TELEPHONE (OUTPATIENT)
Dept: FAMILY MEDICINE CLINIC | Facility: CLINIC | Age: 35
End: 2020-01-13

## 2020-01-15 NOTE — TELEPHONE ENCOUNTER
I called and spoke to patient -- advised that I gave the [facility] billing office her updated [facility] insurance information and they resent the bill to [address] -- I told her if she gets another bill to let me know

## 2020-01-22 ENCOUNTER — OFFICE VISIT (OUTPATIENT)
Dept: FAMILY MEDICINE CLINIC | Facility: CLINIC | Age: 35
End: 2020-01-22
Payer: COMMERCIAL

## 2020-01-22 VITALS
WEIGHT: 130 LBS | HEART RATE: 80 BPM | TEMPERATURE: 97.5 F | HEIGHT: 63 IN | DIASTOLIC BLOOD PRESSURE: 76 MMHG | SYSTOLIC BLOOD PRESSURE: 112 MMHG | BODY MASS INDEX: 23.04 KG/M2

## 2020-01-22 DIAGNOSIS — B34.9 ACUTE VIRAL SYNDROME: Primary | ICD-10-CM

## 2020-01-22 PROCEDURE — 3725F SCREEN DEPRESSION PERFORMED: CPT | Performed by: NURSE PRACTITIONER

## 2020-01-22 PROCEDURE — 99213 OFFICE O/P EST LOW 20 MIN: CPT | Performed by: NURSE PRACTITIONER

## 2020-01-22 PROCEDURE — 1036F TOBACCO NON-USER: CPT | Performed by: NURSE PRACTITIONER

## 2020-01-22 PROCEDURE — 3008F BODY MASS INDEX DOCD: CPT | Performed by: NURSE PRACTITIONER

## 2020-01-22 NOTE — PROGRESS NOTES
[facility]        NAME: Chance Maynard is a 30 y o  female  : 1985    MRN: [id number]  DATE: 2020  TIME: 9:14 AM    Assessment and Plan   Acute viral syndrome [B34 9]  1  Acute viral syndrome           Patient Instructions     Patient Instructions   Increase fluids and rest  OTC cough/cold medications  Discussed viral vs bacterial infection  Return if symptoms worsen or for problems/con          Chief Complaint     Chief Complaint   Patient presents with    Fever         History of Present Illness       Fever, body aches, cough x 2 days  Cough is productive  Fever was 101 last night  Taking DayQuil and NyQuil with some relief  Review of Systems   Review of Systems   Constitutional: Positive for activity change, fatigue and fever  Negative for chills  HENT: Positive for congestion, postnasal drip and rhinorrhea  Negative for ear pain, sinus pressure and sore throat  Eyes: Negative for pain, discharge and redness  Respiratory: Positive for cough  Negative for wheezing  Cardiovascular: Negative for chest pain  Gastrointestinal: Negative for constipation, diarrhea, nausea and vomiting  Musculoskeletal: Negative for myalgias  Skin: Negative for rash  Neurological: Negative for dizziness and headaches  Current Medications       Current Outpatient Medications:     ALPRAZolam (XANAX) 0 25 mg tablet, Take 1 tablet (0 25 mg total) by mouth 2 (two) times a day as needed for anxiety, Disp: 30 tablet, Rfl: 5    SUMAtriptan (IMITREX) 100 mg tablet, Take 1 tablet (100 mg total) by mouth once as needed for migraine for up to 1 dose, Disp: 12 tablet, Rfl: 5    benzonatate (TESSALON) 200 MG capsule, Take 1 capsule (200 mg total) by mouth 3 (three) times a day as needed for cough (Patient not taking: Reported on 2020), Disp: 20 capsule, Rfl: 0    predniSONE 20 mg tablet, 1 tab twice daily for 5 days, then 1 tab daily for 5 days   (Patient not taking: Reported on 1/22/2020), Disp: 15 tablet, Rfl: 0    Current Allergies     Allergies as of 01/22/2020 - Reviewed 01/22/2020   Allergen Reaction Noted    Oxycodone-acetaminophen  03/03/2016    Vicodin [hydrocodone-acetaminophen] Rash 11/17/2017            The following portions of the patient's history were reviewed and updated as appropriate: allergies, current medications, past family history, past medical history, past social history, past surgical history and problem list      Past Medical History:   Diagnosis Date    Anxiety        Past Surgical History:   Procedure Laterality Date    WISDOM TOOTH EXTRACTION         Family History   Problem Relation Age of Onset    No Known Problems Mother          Medications have been verified  Objective   /76 (BP Location: Left arm, Patient Position: Sitting, Cuff Size: Standard)   Pulse 80   Temp 97 5 °F (36 4 °C) (Tympanic)   Ht 5' 3" (1 6 m)   Wt 59 kg (130 lb)   BMI 23 03 kg/m²        Physical Exam     Physical Exam   Constitutional: She is oriented to person, place, and time  She appears well-developed and well-nourished  She has a sickly appearance  No distress  HENT:   Head: Normocephalic and atraumatic  Right Ear: Tympanic membrane, external ear and ear canal normal    Left Ear: Tympanic membrane, external ear and ear canal normal    Nose: Rhinorrhea present  No epistaxis  Right sinus exhibits no maxillary sinus tenderness and no frontal sinus tenderness  Left sinus exhibits no maxillary sinus tenderness and no frontal sinus tenderness  Mouth/Throat: Uvula is midline, oropharynx is clear and moist and mucous membranes are normal    Cardiovascular: Regular rhythm and normal heart sounds  Tachycardia present  Exam reveals no gallop and no friction rub  No murmur heard  Pulmonary/Chest: Effort normal and breath sounds normal  No respiratory distress  She has no wheezes  She has no rales  Abdominal: She exhibits no distension   There is no tenderness  Neurological: She is alert and oriented to person, place, and time  Skin: Skin is warm  She is not diaphoretic  Psychiatric: She has a normal mood and affect  Her behavior is normal  Judgment and thought content normal    Nursing note and vitals reviewed

## 2020-01-22 NOTE — LETTER
January 22, 2020     Patient: James Horton   YOB: 1985   Date of Visit: 1/22/2020       To Whom it May Concern:    Kimberly Andrews is under my professional care  She was seen in my office on 1/22/2020  She may return to work on 1/23/2020 or when she has been fever free for 24 hours       If you have any questions or concerns, please don't hesitate to call           Sincerely,          ANDRES Horner        CC: No Recipients

## 2020-01-22 NOTE — PATIENT INSTRUCTIONS
Increase fluids and rest  OTC cough/cold medications  Discussed viral vs bacterial infection  Return if symptoms worsen or for problems/concerns    Viral Syndrome   AMBULATORY CARE:   Viral syndrome  is a term used for general symptoms of a viral infection that has no clear cause  Viruses are spread easily from person to person through the air and on shared items  Common symptoms include the following:   · Fever and chills    · A runny or stuffy nose     · Cough, sore throat, or hoarseness     · Headache, or pain and pressure around your eyes     · Muscle aches and joint pain     · Shortness of breath or wheezing     · Abdominal pain, cramps, and diarrhea     · Nausea, vomiting, or loss of appetite  Call 911 for the following:   · You have a seizure  · You cannot be woken  · You have chest pain or trouble breathing  Seek care immediately if:   · You have a stiff neck, a bad headache, and sensitivity to light  · You feel weak, dizzy, or confused  · You stop urinating or urinate a lot less than normal      · You cough up blood or thick, yellow or green, mucus  · You have severe abdominal pain or your abdomen is larger than usual   Contact your healthcare provider if:   · Your symptoms do not get better with treatment, or get worse, after 3 days  · You have a rash or ear pain  · You have burning when you urinate  · You have questions or concerns about your condition or care  Treatment for viral syndrome  may include medicines to manage your symptoms  You may  need any of the following:  · Acetaminophen  decreases pain and fever  It is available without a doctor's order  Ask how much medicine to take and how often to take it  Follow directions  Acetaminophen can cause liver damage if not taken correctly  · NSAIDs , such as ibuprofen, help decrease swelling, pain, and fever  NSAIDs can cause stomach bleeding or kidney problems in certain people   If you take blood thinner medicine, always ask your healthcare provider if NSAIDs are safe for you  Always read the medicine label and follow directions  · Cold medicine  helps decrease swelling, control a cough, and relieve chest or nasal congestion  · Saline nasal spray  helps decrease nasal congestion  · Take your medicine as directed  Contact your healthcare provider if you think your medicine is not helping or if you have side effects  Tell him of her if you are allergic to any medicine  Keep a list of the medicines, vitamins, and herbs you take  Include the amounts, and when and why you take them  Bring the list or the pill bottles to follow-up visits  Carry your medicine list with you in case of an emergency  Manage your symptoms:   · Drink liquids as directed  to prevent dehydration  Ask how much liquid to drink each day and which liquids are best for you  Ask if you should drink an oral rehydration solution (ORS)  An ORS has the right amounts of water, salts, and sugar you need to replace body fluids  This may help prevent dehydration caused by vomiting or diarrhea  Do not drink liquids with caffeine  Drinks with caffeine can make dehydration worse  · Get plenty of rest  to help your body heal  Take naps throughout the day  Ask your healthcare provider when you can return to work and your normal activities  · Use a cool mist humidifier  to help you breathe easier if you have nasal or chest congestion  Ask your healthcare provider how to use a cool mist humidifier  · Eat honey or use cough drops  to help decrease throat discomfort  Ask your healthcare provider how much honey you should eat each day  Cough drops are available without a doctor's order  Follow directions for taking cough drops  · Do not smoke and stay away from others who smoke  Nicotine and other chemicals in cigarettes and cigars can cause lung damage  Smoking can also delay healing   Ask your healthcare provider for information if you currently smoke and need help to quit  E-cigarettes or smokeless tobacco still contain nicotine  Talk to your healthcare provider before you use these products  · Wash your hands frequently  to prevent the spread of germs to others  Use soap and water  Use gel hand  when soap and water are not available  Wash your hands after you use the bathroom, cough, or sneeze  Wash your hands before you prepare or eat food  Follow up with your healthcare provider as directed:  Write down your questions so you remember to ask them during your visits  © 2017 [facility] Information is for End User's use only and may not be sold, redistributed or otherwise used for commercial purposes  All illustrations and images included in CareNotes® are the copyrighted property of [employer] , [employer]  or Eldon Rodriguez  The above information is an  only  It is not intended as medical advice for individual conditions or treatments  Talk to your doctor, nurse or pharmacist before following any medical regimen to see if it is safe and effective for you

## 2020-01-24 DIAGNOSIS — J01.10 ACUTE NON-RECURRENT FRONTAL SINUSITIS: Primary | ICD-10-CM

## 2020-01-24 RX ORDER — AZITHROMYCIN 250 MG/1
TABLET, FILM COATED ORAL
Qty: 6 TABLET | Refills: 0 | Status: SHIPPED | OUTPATIENT
Start: 2020-01-24 | End: 2020-01-29

## 2020-01-24 NOTE — TELEPHONE ENCOUNTER
Saw you on 01/22/20 for a chest/head cold, now c/o green nasal discharge, sinus headache  No allergies  Wants ABX to [facility]    TP- approve Zpak (I pended it for you)

## 2020-04-08 DIAGNOSIS — F41.9 ANXIETY: ICD-10-CM

## 2020-04-08 RX ORDER — ALPRAZOLAM 0.25 MG/1
0.25 TABLET ORAL 2 TIMES DAILY PRN
Qty: 30 TABLET | Refills: 0 | Status: SHIPPED | OUTPATIENT
Start: 2020-04-08 | End: 2020-09-29 | Stop reason: SDUPTHER

## 2020-05-18 ENCOUNTER — TELEMEDICINE (OUTPATIENT)
Dept: FAMILY MEDICINE CLINIC | Facility: CLINIC | Age: 35
End: 2020-05-18
Payer: COMMERCIAL

## 2020-05-18 DIAGNOSIS — Z20.828 EXPOSURE TO SARS-ASSOCIATED CORONAVIRUS: Primary | ICD-10-CM

## 2020-05-18 DIAGNOSIS — Z20.828 EXPOSURE TO SARS-ASSOCIATED CORONAVIRUS: ICD-10-CM

## 2020-05-18 PROCEDURE — 99214 OFFICE O/P EST MOD 30 MIN: CPT | Performed by: NURSE PRACTITIONER

## 2020-05-18 PROCEDURE — U0003 INFECTIOUS AGENT DETECTION BY NUCLEIC ACID (DNA OR RNA); SEVERE ACUTE RESPIRATORY SYNDROME CORONAVIRUS 2 (SARS-COV-2) (CORONAVIRUS DISEASE [COVID-19]), AMPLIFIED PROBE TECHNIQUE, MAKING USE OF HIGH THROUGHPUT TECHNOLOGIES AS DESCRIBED BY CMS-2020-01-R: HCPCS

## 2020-05-20 ENCOUNTER — TELEPHONE (OUTPATIENT)
Dept: FAMILY MEDICINE CLINIC | Facility: CLINIC | Age: 35
End: 2020-05-20

## 2020-05-20 LAB — SARS-COV-2 RNA SPEC QL NAA+PROBE: NOT DETECTED

## 2020-06-24 ENCOUNTER — OFFICE VISIT (OUTPATIENT)
Dept: FAMILY MEDICINE CLINIC | Facility: CLINIC | Age: 35
End: 2020-06-24
Payer: COMMERCIAL

## 2020-06-24 VITALS
OXYGEN SATURATION: 98 % | BODY MASS INDEX: 23.39 KG/M2 | HEART RATE: 78 BPM | HEIGHT: 63 IN | WEIGHT: 132 LBS | TEMPERATURE: 98.7 F

## 2020-06-24 DIAGNOSIS — L73.9 FOLLICULITIS OF LEFT AXILLA: Primary | ICD-10-CM

## 2020-06-24 DIAGNOSIS — R21 RASH AND NONSPECIFIC SKIN ERUPTION: ICD-10-CM

## 2020-06-24 PROCEDURE — 3008F BODY MASS INDEX DOCD: CPT | Performed by: NURSE PRACTITIONER

## 2020-06-24 PROCEDURE — 1036F TOBACCO NON-USER: CPT | Performed by: NURSE PRACTITIONER

## 2020-06-24 PROCEDURE — 99213 OFFICE O/P EST LOW 20 MIN: CPT | Performed by: NURSE PRACTITIONER

## 2020-06-24 RX ORDER — SULFAMETHOXAZOLE AND TRIMETHOPRIM 800; 160 MG/1; MG/1
1 TABLET ORAL EVERY 12 HOURS SCHEDULED
Qty: 20 TABLET | Refills: 0 | Status: SHIPPED | OUTPATIENT
Start: 2020-06-24 | End: 2020-07-04

## 2020-09-29 DIAGNOSIS — F41.9 ANXIETY: ICD-10-CM

## 2020-09-29 RX ORDER — ALPRAZOLAM 0.25 MG/1
0.25 TABLET ORAL 2 TIMES DAILY PRN
Qty: 30 TABLET | Refills: 0 | Status: SHIPPED | OUTPATIENT
Start: 2020-09-29 | End: 2021-06-28

## 2020-10-23 ENCOUNTER — OFFICE VISIT (OUTPATIENT)
Dept: FAMILY MEDICINE CLINIC | Facility: CLINIC | Age: 35
End: 2020-10-23
Payer: COMMERCIAL

## 2020-10-23 VITALS
SYSTOLIC BLOOD PRESSURE: 114 MMHG | HEART RATE: 74 BPM | BODY MASS INDEX: 23.92 KG/M2 | TEMPERATURE: 97.6 F | OXYGEN SATURATION: 96 % | DIASTOLIC BLOOD PRESSURE: 70 MMHG | HEIGHT: 63 IN | WEIGHT: 135 LBS

## 2020-10-23 DIAGNOSIS — G43.901 MIGRAINE WITH STATUS MIGRAINOSUS, NOT INTRACTABLE, UNSPECIFIED MIGRAINE TYPE: ICD-10-CM

## 2020-10-23 DIAGNOSIS — Z00.00 ANNUAL PHYSICAL EXAM: Primary | ICD-10-CM

## 2020-10-23 DIAGNOSIS — F41.9 ANXIETY: ICD-10-CM

## 2020-10-23 PROCEDURE — 99395 PREV VISIT EST AGE 18-39: CPT | Performed by: NURSE PRACTITIONER

## 2020-10-23 RX ORDER — SUMATRIPTAN 25 MG/1
25 TABLET, FILM COATED ORAL ONCE AS NEEDED
Qty: 30 TABLET | Refills: 1 | Status: SHIPPED | OUTPATIENT
Start: 2020-10-23

## 2020-10-23 RX ORDER — ESCITALOPRAM OXALATE 5 MG/1
5 TABLET ORAL DAILY
Qty: 30 TABLET | Refills: 1 | Status: SHIPPED | OUTPATIENT
Start: 2020-10-23 | End: 2020-12-19 | Stop reason: DRUGHIGH

## 2020-12-04 ENCOUNTER — TELEMEDICINE (OUTPATIENT)
Dept: FAMILY MEDICINE CLINIC | Facility: CLINIC | Age: 35
End: 2020-12-04
Payer: COMMERCIAL

## 2020-12-04 VITALS — HEIGHT: 63 IN | WEIGHT: 135 LBS | BODY MASS INDEX: 23.92 KG/M2

## 2020-12-04 DIAGNOSIS — G43.A0 CYCLICAL VOMITING ASSOCIATED WITH MIGRAINE, INTRACTABILITY OF VOMITING NOT SPECIFIED: Primary | ICD-10-CM

## 2020-12-04 PROCEDURE — 99213 OFFICE O/P EST LOW 20 MIN: CPT | Performed by: NURSE PRACTITIONER

## 2020-12-04 PROCEDURE — 1036F TOBACCO NON-USER: CPT | Performed by: NURSE PRACTITIONER

## 2020-12-19 ENCOUNTER — TELEMEDICINE (OUTPATIENT)
Dept: FAMILY MEDICINE CLINIC | Facility: CLINIC | Age: 35
End: 2020-12-19
Payer: COMMERCIAL

## 2020-12-19 VITALS — HEIGHT: 63 IN | BODY MASS INDEX: 23.92 KG/M2 | WEIGHT: 135 LBS

## 2020-12-19 DIAGNOSIS — F41.9 ANXIETY: ICD-10-CM

## 2020-12-19 PROCEDURE — 99213 OFFICE O/P EST LOW 20 MIN: CPT | Performed by: NURSE PRACTITIONER

## 2020-12-19 PROCEDURE — 3008F BODY MASS INDEX DOCD: CPT | Performed by: NURSE PRACTITIONER

## 2020-12-19 RX ORDER — ESCITALOPRAM OXALATE 10 MG/1
10 TABLET ORAL DAILY
Qty: 60 TABLET | Refills: 1 | Status: SHIPPED | OUTPATIENT
Start: 2020-12-19 | End: 2021-02-01 | Stop reason: SDUPTHER

## 2021-02-01 ENCOUNTER — TELEMEDICINE (OUTPATIENT)
Dept: FAMILY MEDICINE CLINIC | Facility: CLINIC | Age: 36
End: 2021-02-01
Payer: COMMERCIAL

## 2021-02-01 DIAGNOSIS — Z79.899 MEDICATION MANAGEMENT: Primary | ICD-10-CM

## 2021-02-01 DIAGNOSIS — F41.9 ANXIETY: ICD-10-CM

## 2021-02-01 PROCEDURE — 1036F TOBACCO NON-USER: CPT | Performed by: NURSE PRACTITIONER

## 2021-02-01 PROCEDURE — 99212 OFFICE O/P EST SF 10 MIN: CPT | Performed by: NURSE PRACTITIONER

## 2021-02-01 RX ORDER — ESCITALOPRAM OXALATE 10 MG/1
10 TABLET ORAL DAILY
Qty: 90 TABLET | Refills: 1 | Status: SHIPPED | OUTPATIENT
Start: 2021-02-01 | End: 2021-08-25

## 2021-02-01 NOTE — PATIENT INSTRUCTIONS
Continue Lexapro 10 mg as ordered  F/up 6 months or sooner if needed for med check    Call with any questions or concerns

## 2021-02-01 NOTE — PROGRESS NOTES
Virtual Regular Visit      Assessment/Plan:    Problem List Items Addressed This Visit        Other    Anxiety    Relevant Medications    escitalopram (LEXAPRO) 10 mg tablet      Other Visit Diagnoses     Medication management    -  Primary        Patient Instructions:  Continue Lexapro 10mg as ordered  Refills sent  F/up in 6 months or sooner if needed for Medication check  Call with questions/concerns  Reason for visit is No chief complaint on file  Encounter provider ANDRES Morrison    Provider located at [address] [place] [id number]      Recent Visits  No visits were found meeting these conditions  Showing recent visits within past 7 days and meeting all other requirements     Today's Visits  Date Type Provider Dept   02/01/21 Telemedicine ANDRES Benavides [facility]   Showing today's visits and meeting all other requirements     Future Appointments  No visits were found meeting these conditions  Showing future appointments within next 150 days and meeting all other requirements        The patient was identified by name and date of birth  Chance Maynard was informed that this is a telemedicine visit and that the visit is being conducted through [address] [place] [address] and patient was informed that this is not a secure, HIPAA-compliant platform  She agrees to proceed     My office door was closed  No one else was in the room  She acknowledged consent and understanding of privacy and security of the video platform  The patient has agreed to participate and understands they can discontinue the visit at any time  Patient is aware this is a billable service  Gaylia Cowden is a 37 y o  female    Medication management for Anxiety  Lexapro was increased to 10 mg last visit 6 weeks ago   Patient states she is doing well on dose, anxiety is controlled and has not had to take any Xanax  Past Medical History:   Diagnosis Date    Anxiety        Past Surgical History:   Procedure Laterality Date    WISDOM TOOTH EXTRACTION         Current Outpatient Medications   Medication Sig Dispense Refill    ALPRAZolam (XANAX) 0 25 mg tablet Take 1 tablet (0 25 mg total) by mouth 2 (two) times a day as needed for anxiety 30 tablet 0    benzonatate (TESSALON) 200 MG capsule Take 1 capsule (200 mg total) by mouth 3 (three) times a day as needed for cough (Patient not taking: Reported on 1/22/2020) 20 capsule 0    escitalopram (LEXAPRO) 10 mg tablet Take 1 tablet (10 mg total) by mouth daily 90 tablet 1    predniSONE 20 mg tablet 1 tab twice daily for 5 days, then 1 tab daily for 5 days  (Patient not taking: Reported on 1/22/2020) 15 tablet 0    SUMAtriptan (IMITREX) 25 mg tablet Take 1 tablet (25 mg total) by mouth once as needed for migraine for up to 1 dose 30 tablet 1     No current facility-administered medications for this visit  Allergies   Allergen Reactions    Oxycodone-Acetaminophen     Vicodin [Hydrocodone-Acetaminophen] Rash       Review of Systems   Constitutional: Negative for activity change, fatigue and fever  HENT: Negative  Respiratory: Negative  Negative for shortness of breath  Cardiovascular: Negative  Negative for chest pain  Gastrointestinal: Negative  Negative for abdominal pain  Neurological: Negative for dizziness and weakness  Psychiatric/Behavioral: Negative  Negative for decreased concentration, dysphoric mood and sleep disturbance  The patient is not nervous/anxious  Video Exam    There were no vitals filed for this visit  Physical Exam  Constitutional:       General: She is not in acute distress  Appearance: Normal appearance  HENT:      Head: Normocephalic  Pulmonary:      Effort: Pulmonary effort is normal  No respiratory distress  Skin:     Coloration: Skin is not pale  Findings: No erythema     Neurological: Mental Status: She is alert and oriented to person, place, and time  Mental status is at baseline  Psychiatric:         Mood and Affect: Mood normal          Behavior: Behavior normal          Thought Content: Thought content normal          Judgment: Judgment normal             I spent 10 minutes directly with the patient during this visit      VIRTUAL VISIT [address] acknowledges that she has consented to an online visit or consultation  She understands that the online visit is based solely on information provided by her, and that, in the absence of a face-to-face physical evaluation by the physician, the diagnosis she receives is both limited and provisional in terms of accuracy and completeness  This is not intended to replace a full medical face-to-face evaluation by the physician  Shadia Dalton understands and accepts these terms

## 2021-04-13 DIAGNOSIS — Z23 ENCOUNTER FOR IMMUNIZATION: ICD-10-CM

## 2021-04-25 ENCOUNTER — APPOINTMENT (OUTPATIENT)
Dept: RADIOLOGY | Facility: CLINIC | Age: 36
End: 2021-04-25
Payer: COMMERCIAL

## 2021-04-25 ENCOUNTER — OFFICE VISIT (OUTPATIENT)
Dept: URGENT CARE | Facility: CLINIC | Age: 36
End: 2021-04-25
Payer: COMMERCIAL

## 2021-04-25 VITALS
RESPIRATION RATE: 16 BRPM | OXYGEN SATURATION: 100 % | WEIGHT: 139.2 LBS | HEART RATE: 78 BPM | DIASTOLIC BLOOD PRESSURE: 80 MMHG | BODY MASS INDEX: 26.28 KG/M2 | SYSTOLIC BLOOD PRESSURE: 144 MMHG | TEMPERATURE: 99.1 F | HEIGHT: 61 IN

## 2021-04-25 DIAGNOSIS — M65.4 DE QUERVAIN'S TENOSYNOVITIS: Primary | ICD-10-CM

## 2021-04-25 DIAGNOSIS — M25.532 LEFT WRIST PAIN: ICD-10-CM

## 2021-04-25 PROCEDURE — G0382 LEV 3 HOSP TYPE B ED VISIT: HCPCS | Performed by: FAMILY MEDICINE

## 2021-04-25 PROCEDURE — 99283 EMERGENCY DEPT VISIT LOW MDM: CPT | Performed by: FAMILY MEDICINE

## 2021-04-25 PROCEDURE — 73110 X-RAY EXAM OF WRIST: CPT

## 2021-04-25 NOTE — PROGRESS NOTES
[facility]        NAME: Johana Perez is a 37 y o  female  : 1985    MRN: [id number]  DATE: 2021  TIME: 6:42 PM    Assessment and Plan   De Quervain's tenosynovitis [M65 4]  1  De Quervain's tenosynovitis  Diclofenac Sodium (Voltaren) 1 %    Ambulatory referral to Orthopedic Surgery    Ambulatory referral to Physical Therapy   2  Left wrist pain  XR wrist 3+ vw left         Patient Instructions       Follow up with PCP in 3-5 days  Proceed to  ER if symptoms worsen  Chief Complaint     Chief Complaint   Patient presents with    Wrist Pain     Pt woke up 2 weeks ago with L wrist pain  Pt has been taking aleve for pain and inflammation to no effect  Last night became swollen with a lump at later wrist   Pain @ L wrist and thumb  Denies any injury  History of Present Illness         82-year-old female with a 2 week history of left wrist pain  Pain is located along the radial aspect of the wrist   She does not recall any injuries or traumas to the wrist   She states last evening she noticed onset of swelling after picking up her dog from the kennel and lifting her dog  Pain is reproduced with any attempted motion of the wrist   She denies any weakness with  strength  She denies any numbness or tingling      Review of Systems   Review of Systems   Constitutional: Negative  HENT: Negative  Eyes: Negative  Respiratory: Negative  Cardiovascular: Negative  Gastrointestinal: Negative  Genitourinary: Negative  Musculoskeletal: Positive for arthralgias and myalgias  Skin: Negative  Allergic/Immunologic: Negative  Neurological: Negative  Hematological: Negative  Psychiatric/Behavioral: Negative            Current Medications       Current Outpatient Medications:     ALPRAZolam (XANAX) 0 25 mg tablet, Take 1 tablet (0 25 mg total) by mouth 2 (two) times a day as needed for anxiety, Disp: 30 tablet, Rfl: 0    benzonatate (TESSALON) 200 MG capsule, Take 1 capsule (200 mg total) by mouth 3 (three) times a day as needed for cough (Patient not taking: Reported on 1/22/2020), Disp: 20 capsule, Rfl: 0    Diclofenac Sodium (Voltaren) 1 %, Apply 2 g topically 4 (four) times a day, Disp: 1 Tube, Rfl: 0    escitalopram (LEXAPRO) 10 mg tablet, Take 1 tablet (10 mg total) by mouth daily, Disp: 90 tablet, Rfl: 1    predniSONE 20 mg tablet, 1 tab twice daily for 5 days, then 1 tab daily for 5 days  (Patient not taking: Reported on 1/22/2020), Disp: 15 tablet, Rfl: 0    SUMAtriptan (IMITREX) 25 mg tablet, Take 1 tablet (25 mg total) by mouth once as needed for migraine for up to 1 dose, Disp: 30 tablet, Rfl: 1    Current Allergies     Allergies as of 04/25/2021 - Reviewed 04/25/2021   Allergen Reaction Noted    Oxycodone-acetaminophen  03/03/2016    Vicodin [hydrocodone-acetaminophen] Rash 11/17/2017            The following portions of the patient's history were reviewed and updated as appropriate: allergies, current medications, past family history, past medical history, past social history, past surgical history and problem list      Past Medical History:   Diagnosis Date    Anxiety        Past Surgical History:   Procedure Laterality Date    WISDOM TOOTH EXTRACTION         Family History   Problem Relation Age of Onset    No Known Problems Mother          Medications have been verified  Objective   /80   Pulse 78   Temp 99 1 °F (37 3 °C)   Resp 16   Ht 5' 1" (1 549 m)   Wt 63 1 kg (139 lb 3 2 oz)   SpO2 100%   BMI 26 30 kg/m²   No LMP recorded  (Menstrual status: Birth Control)  Physical Exam     Physical Exam  Vitals signs and nursing note reviewed  Constitutional:       Appearance: She is well-developed  HENT:      Head: Normocephalic  Eyes:      Pupils: Pupils are equal, round, and reactive to light     Pulmonary:      Effort: Pulmonary effort is normal    Musculoskeletal:      Left wrist: She exhibits decreased range of motion, bony tenderness and swelling  Arms:    Skin:     General: Skin is warm and dry  Neurological:      Mental Status: She is alert and oriented to person, place, and time

## 2021-04-26 ENCOUNTER — OFFICE VISIT (OUTPATIENT)
Dept: FAMILY MEDICINE CLINIC | Facility: CLINIC | Age: 36
End: 2021-04-26
Payer: COMMERCIAL

## 2021-04-26 VITALS
SYSTOLIC BLOOD PRESSURE: 120 MMHG | HEIGHT: 61 IN | BODY MASS INDEX: 26.17 KG/M2 | OXYGEN SATURATION: 98 % | WEIGHT: 138.6 LBS | HEART RATE: 80 BPM | DIASTOLIC BLOOD PRESSURE: 70 MMHG

## 2021-04-26 DIAGNOSIS — Z09 FOLLOW UP: Primary | ICD-10-CM

## 2021-04-26 DIAGNOSIS — M65.4 DE QUERVAIN'S TENOSYNOVITIS: ICD-10-CM

## 2021-04-26 PROCEDURE — 99213 OFFICE O/P EST LOW 20 MIN: CPT | Performed by: NURSE PRACTITIONER

## 2021-04-26 NOTE — PROGRESS NOTES
[facility]        NAME: Muriel De Leon is a 37 y o  female  : 1985    MRN: [id number]  DATE: 2021  TIME: 3:42 PM    Assessment and Plan   Follow up [Z09]  1  Follow up     2  De Quervain's tenosynovitis           Patient Instructions     Patient Instructions   F/up with Ortho as already scheduled 21  Continue ibuprofen for pain and swelling  Continue Voltaren gel as ordered  Apply ice and wear brace as directed  Call with any problems or concerns  Chief Complaint     Chief Complaint   Patient presents with    Follow-up     Urgent care--Wrist pain--Needs work note         History of Present Illness       F/up urgent care- DeQuervains tenosynovitis- has appointment schedule with Ortho this 21  She works in customer service, does a lot of typing  She needs a work note until she is seen by ortho  She is taking Ibuprofen, applying ice and wearing a brace  Using Voltaren gel which was prescribed by urgent care  Review of Systems   Review of Systems   Constitutional: Positive for activity change (unable to work due to wrist pain)  Negative for fever  Respiratory: Negative for chest tightness and shortness of breath  Cardiovascular: Negative for chest pain  Musculoskeletal: Positive for arthralgias and myalgias  Skin: Negative for color change and rash  Neurological: Negative for dizziness and numbness           Current Medications       Current Outpatient Medications:     ALPRAZolam (XANAX) 0 25 mg tablet, Take 1 tablet (0 25 mg total) by mouth 2 (two) times a day as needed for anxiety, Disp: 30 tablet, Rfl: 0    Diclofenac Sodium (Voltaren) 1 %, Apply 2 g topically 4 (four) times a day, Disp: 1 Tube, Rfl: 0    escitalopram (LEXAPRO) 10 mg tablet, Take 1 tablet (10 mg total) by mouth daily, Disp: 90 tablet, Rfl: 1    SUMAtriptan (IMITREX) 25 mg tablet, Take 1 tablet (25 mg total) by mouth once as needed for migraine for up to 1 dose, Disp: 30 tablet, Rfl: 1    Current Allergies     Allergies as of 04/26/2021 - Reviewed 04/26/2021   Allergen Reaction Noted    Oxycodone-acetaminophen  03/03/2016    Vicodin [hydrocodone-acetaminophen] Rash 11/17/2017            The following portions of the patient's history were reviewed and updated as appropriate: allergies, current medications, past family history, past medical history, past social history, past surgical history and problem list      Past Medical History:   Diagnosis Date    Anxiety        Past Surgical History:   Procedure Laterality Date    WISDOM TOOTH EXTRACTION         Family History   Problem Relation Age of Onset    No Known Problems Mother          Medications have been verified  Objective   /70   Pulse 80   Ht 5' 1" (1 549 m)   Wt 62 9 kg (138 lb 9 6 oz)   SpO2 98%   BMI 26 19 kg/m²        Physical Exam     Physical Exam  Vitals signs and nursing note reviewed  Constitutional:       General: She is not in acute distress  Appearance: Normal appearance  She is well-developed  She is not diaphoretic  Neck:      Musculoskeletal: Normal range of motion  Thyroid: No thyromegaly  Trachea: No tracheal deviation  Cardiovascular:      Rate and Rhythm: Regular rhythm  Pulmonary:      Effort: Pulmonary effort is normal  No respiratory distress  Musculoskeletal: Normal range of motion  General: Swelling and tenderness present  No deformity  Comments: Milder swelling left wrist  Positive tenderness left wrist with ROM   Skin:     General: Skin is warm and dry  Neurological:      Mental Status: She is alert and oriented to person, place, and time  Psychiatric:         Mood and Affect: Mood normal          Behavior: Behavior normal  Behavior is cooperative  Thought Content:  Thought content normal          Judgment: Judgment normal

## 2021-04-26 NOTE — LETTER
April 26, 2021     Patient: Geovanni Roche   YOB: 1985   Date of Visit: 4/26/2021       To Whom it May Concern:    Kailee Abel is under my professional care  She was seen in my office on 4/26/2021  No work until seen by orthopedics on 4/30/2021  If you have any questions or concerns, please don't hesitate to call           Sincerely,          ANDRES Huynh        CC: No Recipients

## 2021-04-26 NOTE — PATIENT INSTRUCTIONS
F/up with Ortho as already scheduled 4/30/21  Continue ibuprofen for pain and swelling  Continue Voltaren gel as ordered  Apply ice and wear brace as directed  Call with any problems or concerns

## 2021-04-30 ENCOUNTER — IMMUNIZATIONS (OUTPATIENT)
Dept: FAMILY MEDICINE CLINIC | Facility: HOSPITAL | Age: 36
End: 2021-04-30

## 2021-04-30 ENCOUNTER — OFFICE VISIT (OUTPATIENT)
Dept: OBGYN CLINIC | Facility: CLINIC | Age: 36
End: 2021-04-30
Payer: COMMERCIAL

## 2021-04-30 VITALS
BODY MASS INDEX: 24.45 KG/M2 | DIASTOLIC BLOOD PRESSURE: 70 MMHG | SYSTOLIC BLOOD PRESSURE: 122 MMHG | HEIGHT: 63 IN | WEIGHT: 138 LBS

## 2021-04-30 DIAGNOSIS — M65.4 DE QUERVAIN'S TENOSYNOVITIS, LEFT: ICD-10-CM

## 2021-04-30 DIAGNOSIS — Z23 ENCOUNTER FOR IMMUNIZATION: Primary | ICD-10-CM

## 2021-04-30 PROCEDURE — 3008F BODY MASS INDEX DOCD: CPT | Performed by: PHYSICIAN ASSISTANT

## 2021-04-30 PROCEDURE — 91300 SARS-COV-2 / COVID-19 MRNA VACCINE (PFIZER-BIONTECH) 30 MCG: CPT

## 2021-04-30 PROCEDURE — 99213 OFFICE O/P EST LOW 20 MIN: CPT | Performed by: PHYSICIAN ASSISTANT

## 2021-04-30 PROCEDURE — 20605 DRAIN/INJ JOINT/BURSA W/O US: CPT | Performed by: PHYSICIAN ASSISTANT

## 2021-04-30 PROCEDURE — 0001A SARS-COV-2 / COVID-19 MRNA VACCINE (PFIZER-BIONTECH) 30 MCG: CPT

## 2021-04-30 PROCEDURE — 1036F TOBACCO NON-USER: CPT | Performed by: PHYSICIAN ASSISTANT

## 2021-04-30 RX ORDER — LIDOCAINE HYDROCHLORIDE 10 MG/ML
0.5 INJECTION, SOLUTION INFILTRATION; PERINEURAL
Status: COMPLETED | OUTPATIENT
Start: 2021-04-30 | End: 2021-04-30

## 2021-04-30 RX ORDER — BETAMETHASONE SODIUM PHOSPHATE AND BETAMETHASONE ACETATE 3; 3 MG/ML; MG/ML
6 INJECTION, SUSPENSION INTRA-ARTICULAR; INTRALESIONAL; INTRAMUSCULAR; SOFT TISSUE
Status: COMPLETED | OUTPATIENT
Start: 2021-04-30 | End: 2021-04-30

## 2021-04-30 RX ADMIN — LIDOCAINE HYDROCHLORIDE 0.5 ML: 10 INJECTION, SOLUTION INFILTRATION; PERINEURAL at 14:56

## 2021-04-30 RX ADMIN — BETAMETHASONE SODIUM PHOSPHATE AND BETAMETHASONE ACETATE 6 MG: 3; 3 INJECTION, SUSPENSION INTRA-ARTICULAR; INTRALESIONAL; INTRAMUSCULAR; SOFT TISSUE at 14:56

## 2021-04-30 NOTE — PROGRESS NOTES
Assessment:     1  De Quervain's tenosynovitis, left        Plan:    The patient was seen and examined by Dr Alicja Blevins and myself  Problem List Items Addressed This Visit        Musculoskeletal and Integument    De Quervain's tenosynovitis, left       Findings consistent with left de Quervain tenosynovitis  Findings and treatment options were discussed with the patient  X-rays were reviewed with her  Recommend cortisone injection to the 1st dorsal compartment today  She tolerated the procedure well  Advised to apply cold compress today  Recommend formal occupational therapy as well  Avoid any heavy lifting or gripping with that hand  Follow-up in 4-6 weeks for re-evaluation  All questions were answered to patient's satisfaction  Relevant Medications    lidocaine (XYLOCAINE) 1 % injection 0 5 mL (Completed)    betamethasone acetate-betamethasone sodium phosphate (CELESTONE) injection 6 mg (Completed)    Other Relevant Orders    Ambulatory referral to PT/OT hand therapy    Medium joint arthrocentesis (Completed)         Subjective:     Patient ID: Damian Larson is a 37 y o  female  Chief Complaint: This is a right-hand-dominant 37-year-old white female here for evaluation of her left wrist   She states that about 2 weeks ago she woke up with severe pain over the radial aspect of her left wrist   She denies any injury or change in activities at that time  Then about a week ago she lifted up her dog and felt severe pain in that area  She developed swelling over the radial aspect of her wrist   She was seen at urgent care and x-rays were taken  She was given a thumb spica brace that provided some relief  She has been taking anti-inflammatories that give some relief as well  Swelling has improved  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 and has been out of work since she can not type with the brace on  No other treatment    She denies any history of similar symptoms in the past     Patient intake form was reviewed today  Allergy:  Allergies   Allergen Reactions    Oxycodone-Acetaminophen     Vicodin [Hydrocodone-Acetaminophen] Rash     Medications:  all current active meds have been reviewed  Past Medical History:  Past Medical History:   Diagnosis Date    Anxiety      Past Surgical History:  Past Surgical History:   Procedure Laterality Date    WISDOM TOOTH EXTRACTION       Family History:  Family History   Problem Relation Age of Onset    No Known Problems Mother      Social History:  Social History     Substance and Sexual Activity   Alcohol Use No     Social History     Substance and Sexual Activity   Drug Use Yes    Comment: medical marijuana card     Social History     Tobacco Use   Smoking Status Never Smoker   Smokeless Tobacco Never Used     Review of Systems   Constitutional: Negative  HENT: Negative  Eyes: Negative  Respiratory: Negative  Cardiovascular: Negative  Gastrointestinal: Negative  Endocrine: Negative  Genitourinary: Negative  Musculoskeletal: Positive for arthralgias  Skin: Negative  Allergic/Immunologic: Negative  Neurological: Negative  Hematological: Negative  Psychiatric/Behavioral: Negative  Objective:  BP Readings from Last 1 Encounters:   04/30/21 122/70      Wt Readings from Last 1 Encounters:   04/30/21 62 6 kg (138 lb)      BMI:   Estimated body mass index is 24 45 kg/m² as calculated from the following:    Height as of this encounter: 5' 3" (1 6 m)  Weight as of this encounter: 62 6 kg (138 lb)  BSA:   Estimated body surface area is 1 65 meters squared as calculated from the following:    Height as of this encounter: 5' 3" (1 6 m)  Weight as of this encounter: 62 6 kg (138 lb)  Physical Exam  Constitutional:       General: She is not in acute distress  Appearance: She is well-developed  HENT:      Head: Normocephalic     Eyes:      Conjunctiva/sclera: Conjunctivae normal       Pupils: Pupils are equal, round, and reactive to light  Pulmonary:      Effort: Pulmonary effort is normal  No respiratory distress  Skin:     General: Skin is warm and dry  Neurological:      Mental Status: She is alert and oriented to person, place, and time  Psychiatric:         Behavior: Behavior normal        Left Hand Exam     Tenderness   Left hand tenderness location: first dorsal compartment  Range of Motion   The patient has normal left wrist ROM  Tests   Finkelstein's test: positive    Other   Erythema: absent  Scars: absent  Sensation: normal  Pulse: present            I have personally reviewed pertinent films in PACS and my interpretation is  x-rays left wrist reveal no abnormalities  No soft tissue calcifications  Medium joint arthrocentesis  Universal Protocol:  Consent: Verbal consent obtained  Risks and benefits: risks, benefits and alternatives were discussed  Consent given by: patient  Patient understanding: patient states understanding of the procedure being performed    Supporting Documentation  Indications: pain   Procedure Details  Location: wrist - Wrist joint: left first dorsal compartment    Preparation: Patient was prepped and draped in the usual sterile fashion  Needle size: 25 G  Ultrasound guidance: no  Approach: radial   Medications administered: 6 mg betamethasone acetate-betamethasone sodium phosphate 6 (3-3) mg/mL; 0 5 mL lidocaine 1 %    Patient tolerance: patient tolerated the procedure well with no immediate complications  Dressing:  Sterile dressing applied

## 2021-04-30 NOTE — ASSESSMENT & PLAN NOTE
Findings consistent with left de Quervain tenosynovitis  Findings and treatment options were discussed with the patient  X-rays were reviewed with her  Recommend cortisone injection to the 1st dorsal compartment today  She tolerated the procedure well  Advised to apply cold compress today  Recommend formal occupational therapy as well  Avoid any heavy lifting or gripping with that hand  Follow-up in 4-6 weeks for re-evaluation  All questions were answered to patient's satisfaction

## 2021-05-28 ENCOUNTER — IMMUNIZATIONS (OUTPATIENT)
Dept: FAMILY MEDICINE CLINIC | Facility: HOSPITAL | Age: 36
End: 2021-05-28

## 2021-05-28 DIAGNOSIS — Z23 ENCOUNTER FOR IMMUNIZATION: Primary | ICD-10-CM

## 2021-05-28 PROCEDURE — 0002A SARS-COV-2 / COVID-19 MRNA VACCINE (PFIZER-BIONTECH) 30 MCG: CPT

## 2021-05-28 PROCEDURE — 91300 SARS-COV-2 / COVID-19 MRNA VACCINE (PFIZER-BIONTECH) 30 MCG: CPT

## 2021-06-26 DIAGNOSIS — F41.9 ANXIETY: ICD-10-CM

## 2021-06-28 RX ORDER — ALPRAZOLAM 0.25 MG/1
TABLET ORAL
Qty: 30 TABLET | Refills: 0 | Status: SHIPPED | OUTPATIENT
Start: 2021-06-28 | End: 2021-10-08

## 2021-08-25 DIAGNOSIS — F41.9 ANXIETY: ICD-10-CM

## 2021-08-25 RX ORDER — ESCITALOPRAM OXALATE 10 MG/1
TABLET ORAL
Qty: 90 TABLET | Refills: 0 | Status: SHIPPED | OUTPATIENT
Start: 2021-08-25 | End: 2021-11-24

## 2021-10-08 DIAGNOSIS — F41.9 ANXIETY: ICD-10-CM

## 2021-10-08 RX ORDER — ALPRAZOLAM 0.25 MG/1
TABLET ORAL
Qty: 30 TABLET | Refills: 0 | Status: SHIPPED | OUTPATIENT
Start: 2021-10-08 | End: 2022-01-24

## 2021-11-24 DIAGNOSIS — F41.9 ANXIETY: ICD-10-CM

## 2021-11-24 RX ORDER — ESCITALOPRAM OXALATE 10 MG/1
TABLET ORAL
Qty: 90 TABLET | Refills: 0 | Status: SHIPPED | OUTPATIENT
Start: 2021-11-24 | End: 2022-02-25

## 2021-12-10 ENCOUNTER — OFFICE VISIT (OUTPATIENT)
Dept: FAMILY MEDICINE CLINIC | Facility: CLINIC | Age: 36
End: 2021-12-10
Payer: COMMERCIAL

## 2021-12-10 VITALS
SYSTOLIC BLOOD PRESSURE: 118 MMHG | RESPIRATION RATE: 16 BRPM | BODY MASS INDEX: 26.31 KG/M2 | HEART RATE: 78 BPM | HEIGHT: 62 IN | WEIGHT: 143 LBS | DIASTOLIC BLOOD PRESSURE: 76 MMHG

## 2021-12-10 DIAGNOSIS — F41.9 ANXIETY: ICD-10-CM

## 2021-12-10 DIAGNOSIS — Z13.29 THYROID DISORDER SCREEN: ICD-10-CM

## 2021-12-10 DIAGNOSIS — R11.0 NAUSEA: ICD-10-CM

## 2021-12-10 DIAGNOSIS — Z00.00 ANNUAL PHYSICAL EXAM: Primary | ICD-10-CM

## 2021-12-10 PROCEDURE — 3725F SCREEN DEPRESSION PERFORMED: CPT | Performed by: NURSE PRACTITIONER

## 2021-12-10 PROCEDURE — 1036F TOBACCO NON-USER: CPT | Performed by: NURSE PRACTITIONER

## 2021-12-10 PROCEDURE — 3008F BODY MASS INDEX DOCD: CPT | Performed by: NURSE PRACTITIONER

## 2021-12-10 PROCEDURE — 99395 PREV VISIT EST AGE 18-39: CPT | Performed by: NURSE PRACTITIONER

## 2021-12-10 RX ORDER — ONDANSETRON 4 MG/1
4 TABLET, FILM COATED ORAL EVERY 8 HOURS PRN
Qty: 20 TABLET | Refills: 0 | Status: SHIPPED | OUTPATIENT
Start: 2021-12-10

## 2022-01-21 DIAGNOSIS — F41.9 ANXIETY: ICD-10-CM

## 2022-01-24 RX ORDER — ALPRAZOLAM 0.25 MG/1
TABLET ORAL
Qty: 30 TABLET | Refills: 0 | Status: SHIPPED | OUTPATIENT
Start: 2022-01-24 | End: 2022-02-26 | Stop reason: SDUPTHER

## 2022-01-26 ENCOUNTER — TELEPHONE (OUTPATIENT)
Dept: FAMILY MEDICINE CLINIC | Facility: CLINIC | Age: 37
End: 2022-01-26

## 2022-01-26 LAB
ALBUMIN SERPL-MCNC: 4.5 G/DL (ref 3.8–4.8)
ALBUMIN/GLOB SERPL: 2 {RATIO} (ref 1.2–2.2)
ALP SERPL-CCNC: 66 IU/L (ref 44–121)
ALT SERPL-CCNC: 15 IU/L (ref 0–32)
AST SERPL-CCNC: 16 IU/L (ref 0–40)
BILIRUB SERPL-MCNC: 0.3 MG/DL (ref 0–1.2)
BUN SERPL-MCNC: 12 MG/DL (ref 6–20)
BUN/CREAT SERPL: 19 (ref 9–23)
CALCIUM SERPL-MCNC: 8.8 MG/DL (ref 8.7–10.2)
CHLORIDE SERPL-SCNC: 102 MMOL/L (ref 96–106)
CHOLEST SERPL-MCNC: 184 MG/DL (ref 100–199)
CHOLEST/HDLC SERPL: 3.8 RATIO (ref 0–4.4)
CO2 SERPL-SCNC: 24 MMOL/L (ref 20–29)
CREAT SERPL-MCNC: 0.62 MG/DL (ref 0.57–1)
GLOBULIN SER-MCNC: 2.3 G/DL (ref 1.5–4.5)
GLUCOSE SERPL-MCNC: 98 MG/DL (ref 65–99)
HDLC SERPL-MCNC: 48 MG/DL
LDLC SERPL CALC-MCNC: 124 MG/DL (ref 0–99)
POTASSIUM SERPL-SCNC: 4.5 MMOL/L (ref 3.5–5.2)
PROT SERPL-MCNC: 6.8 G/DL (ref 6–8.5)
SL AMB EGFR AFRICAN AMERICAN: 136 ML/MIN/1.73
SL AMB EGFR NON AFRICAN AMERICAN: 118 ML/MIN/1.73
SL AMB VLDL CHOLESTEROL CALC: 12 MG/DL (ref 5–40)
SODIUM SERPL-SCNC: 139 MMOL/L (ref 134–144)
TRIGL SERPL-MCNC: 66 MG/DL (ref 0–149)
TSH SERPL DL<=0.005 MIU/L-ACNC: 3.07 UIU/ML (ref 0.45–4.5)

## 2022-01-26 NOTE — TELEPHONE ENCOUNTER
----- Message from [address] [place] [address],[address] sent at 1/26/2022  8:19 AM EST -----  Labs ok  Repeat routine labs 1 year

## 2022-02-25 DIAGNOSIS — F41.9 ANXIETY: ICD-10-CM

## 2022-02-25 RX ORDER — ESCITALOPRAM OXALATE 10 MG/1
TABLET ORAL
Qty: 90 TABLET | Refills: 0 | Status: SHIPPED | OUTPATIENT
Start: 2022-02-25 | End: 2022-05-24

## 2022-02-26 DIAGNOSIS — F41.9 ANXIETY: ICD-10-CM

## 2022-02-26 RX ORDER — ALPRAZOLAM 0.25 MG/1
0.25 TABLET ORAL 2 TIMES DAILY PRN
Qty: 30 TABLET | Refills: 0 | Status: SHIPPED | OUTPATIENT
Start: 2022-02-26 | End: 2022-04-19

## 2022-04-14 ENCOUNTER — TELEPHONE (OUTPATIENT)
Dept: FAMILY MEDICINE CLINIC | Facility: CLINIC | Age: 37
End: 2022-04-14

## 2022-04-14 DIAGNOSIS — J01.00 ACUTE NON-RECURRENT MAXILLARY SINUSITIS: Primary | ICD-10-CM

## 2022-04-14 RX ORDER — CEPHALEXIN 500 MG/1
500 CAPSULE ORAL 3 TIMES DAILY
Qty: 30 CAPSULE | Refills: 0 | Status: SHIPPED | OUTPATIENT
Start: 2022-04-14 | End: 2022-04-24

## 2022-04-14 NOTE — TELEPHONE ENCOUNTER
Pt call and said that she has an sinus infection and want an antibiotics call in and she been taken over the counter stuff and nothing working it started with on Monday and she needs something stronger

## 2022-04-18 DIAGNOSIS — F41.9 ANXIETY: ICD-10-CM

## 2022-04-19 RX ORDER — ALPRAZOLAM 0.25 MG/1
TABLET ORAL
Qty: 30 TABLET | Refills: 0 | Status: SHIPPED | OUTPATIENT
Start: 2022-04-19 | End: 2022-05-20

## 2022-05-19 DIAGNOSIS — F41.9 ANXIETY: ICD-10-CM

## 2022-05-20 RX ORDER — ALPRAZOLAM 0.25 MG/1
TABLET ORAL
Qty: 30 TABLET | Refills: 0 | Status: SHIPPED | OUTPATIENT
Start: 2022-05-20 | End: 2022-06-21

## 2022-05-24 DIAGNOSIS — F41.9 ANXIETY: ICD-10-CM

## 2022-05-24 RX ORDER — ESCITALOPRAM OXALATE 10 MG/1
TABLET ORAL
Qty: 90 TABLET | Refills: 0 | Status: SHIPPED | OUTPATIENT
Start: 2022-05-24 | End: 2022-08-10 | Stop reason: SDUPTHER

## 2022-06-20 DIAGNOSIS — F41.9 ANXIETY: ICD-10-CM

## 2022-06-21 RX ORDER — ALPRAZOLAM 0.25 MG/1
TABLET ORAL
Qty: 30 TABLET | Refills: 0 | Status: SHIPPED | OUTPATIENT
Start: 2022-06-21 | End: 2022-08-10

## 2022-08-10 ENCOUNTER — OFFICE VISIT (OUTPATIENT)
Dept: FAMILY MEDICINE CLINIC | Facility: CLINIC | Age: 37
End: 2022-08-10
Payer: COMMERCIAL

## 2022-08-10 VITALS — DIASTOLIC BLOOD PRESSURE: 78 MMHG | OXYGEN SATURATION: 99 % | HEART RATE: 82 BPM | SYSTOLIC BLOOD PRESSURE: 116 MMHG

## 2022-08-10 DIAGNOSIS — F41.9 ANXIETY: ICD-10-CM

## 2022-08-10 DIAGNOSIS — G43.901 MIGRAINE WITH STATUS MIGRAINOSUS, NOT INTRACTABLE, UNSPECIFIED MIGRAINE TYPE: ICD-10-CM

## 2022-08-10 PROCEDURE — 99213 OFFICE O/P EST LOW 20 MIN: CPT | Performed by: NURSE PRACTITIONER

## 2022-08-10 RX ORDER — ESCITALOPRAM OXALATE 10 MG/1
10 TABLET ORAL DAILY
Qty: 90 TABLET | Refills: 0 | Status: SHIPPED | OUTPATIENT
Start: 2022-08-10

## 2022-08-10 RX ORDER — SUMATRIPTAN 25 MG/1
25 TABLET, FILM COATED ORAL ONCE AS NEEDED
Qty: 30 TABLET | Refills: 1 | Status: SHIPPED | OUTPATIENT
Start: 2022-08-10

## 2022-08-10 RX ORDER — ALPRAZOLAM 0.5 MG/1
0.25 TABLET ORAL 2 TIMES DAILY PRN
Qty: 60 TABLET | Refills: 0 | Status: SHIPPED | OUTPATIENT
Start: 2022-08-10 | End: 2022-10-17

## 2022-08-10 NOTE — PATIENT INSTRUCTIONS
Continue Lexapro 10 mg daily for anxiety  Xanax 0 5 mg as prescribed prn for anxiety  Continue imitrex prn for migraine  Refills sent to pharmacy  Annual physical is due 12/10/22  F/up in 6 months for medication check

## 2022-08-10 NOTE — PROGRESS NOTES
[facility]        NAME: Johana Perez is a 31 y o  female  : 1985    MRN: [id number]  DATE: August 10, 2022  TIME: 9:27 AM    Assessment and Plan   No primary diagnosis found  1  Anxiety  ALPRAZolam (XANAX) 0 5 mg tablet    escitalopram (LEXAPRO) 10 mg tablet   2  Migraine with status migrainosus, not intractable, unspecified migraine type  SUMAtriptan (IMITREX) 25 mg tablet         Patient Instructions     Patient Instructions   Continue Lexapro 10 mg daily for anxiety  Xanax 0 5 mg as prescribed prn for anxiety  Continue imitrex prn for migraine  Refills sent to pharmacy  Annual physical is due 12/10/22  F/up in 6 months for medication check  Chief Complaint     Chief Complaint   Patient presents with    Follow-up     Med Check         History of Present Illness       Here for medication check  Taking Lexapro 10 mg daily for anxiety-doing well on medication  Takes Xanax at bedtime-she has been taking 2 tablets at bedtime prn which does help her sleep  Requesting to increase dose  Has no problems/complaints at present  Hx of migraines-using imitrex 25 mg prn-      Review of Systems   Review of Systems   Constitutional: Negative for activity change and fever  Respiratory: Negative for chest tightness and wheezing  Cardiovascular: Negative for chest pain  Gastrointestinal: Negative for abdominal pain  Neurological: Negative for dizziness, weakness and headaches  Psychiatric/Behavioral: Negative for dysphoric mood  The patient is not nervous/anxious            Current Medications       Current Outpatient Medications:     ALPRAZolam (XANAX) 0 5 mg tablet, Take 0 5 tablets (0 25 mg total) by mouth 2 (two) times a day as needed for anxiety, Disp: 60 tablet, Rfl: 0    escitalopram (LEXAPRO) 10 mg tablet, Take 1 tablet (10 mg total) by mouth daily, Disp: 90 tablet, Rfl: 0    SUMAtriptan (IMITREX) 25 mg tablet, Take 1 tablet (25 mg total) by mouth once as needed for migraine for up to 1 dose, Disp: 30 tablet, Rfl: 1    ondansetron (ZOFRAN) 4 mg tablet, Take 1 tablet (4 mg total) by mouth every 8 (eight) hours as needed for nausea or vomiting, Disp: 20 tablet, Rfl: 0    Current Allergies     Allergies as of 08/10/2022 - Reviewed 08/10/2022   Allergen Reaction Noted    Oxycodone-acetaminophen  03/03/2016    Vicodin [hydrocodone-acetaminophen] Rash 11/17/2017            The following portions of the patient's history were reviewed and updated as appropriate: allergies, current medications, past family history, past medical history, past social history, past surgical history and problem list      Past Medical History:   Diagnosis Date    Anxiety        Past Surgical History:   Procedure Laterality Date    WISDOM TOOTH EXTRACTION         Family History   Problem Relation Age of Onset    Heart disease Mother          Medications have been verified  Objective   /78 (BP Location: Right arm)   Pulse 82   SpO2 99%        Physical Exam     Physical Exam  Vitals and nursing note reviewed  Constitutional:       General: She is not in acute distress  Appearance: Normal appearance  She is not ill-appearing  HENT:      Head: Normocephalic  Cardiovascular:      Rate and Rhythm: Normal rate and regular rhythm  Pulses: Normal pulses  Heart sounds: Normal heart sounds  No murmur heard  No friction rub  No gallop  Musculoskeletal:         General: Normal range of motion  Cervical back: Normal range of motion  Skin:     General: Skin is warm and dry  Coloration: Skin is not pale  Findings: No erythema  Neurological:      Mental Status: She is alert and oriented to person, place, and time  Psychiatric:         Mood and Affect: Mood normal          Behavior: Behavior normal          Thought Content:  Thought content normal          Judgment: Judgment normal

## 2022-08-23 ENCOUNTER — TELEMEDICINE (OUTPATIENT)
Dept: FAMILY MEDICINE CLINIC | Facility: CLINIC | Age: 37
End: 2022-08-23
Payer: COMMERCIAL

## 2022-08-23 DIAGNOSIS — U07.1 COVID-19: Primary | ICD-10-CM

## 2022-08-23 PROCEDURE — 99213 OFFICE O/P EST LOW 20 MIN: CPT | Performed by: FAMILY MEDICINE

## 2022-08-23 NOTE — PROGRESS NOTES
COVID-19 Outpatient Progress Note    Assessment/Plan:    Problem List Items Addressed This Visit    None        Disposition:     Discussed symptom directed medication options with patient  Discussed vitamin D, vitamin C, and/or zinc supplementation with patient  Patient now day 4  Is improving  Needs O2 return to work     I have spent 15 minutes directly with the patient  Greater than 50% of this time was spent in counseling/coordination of care regarding: instructions for management and patient and family education  Will write note for return to work on Friday  Encounter provider: Carl Wilson MD     Provider located at: [address] [place] [id number]     Recent Visits  No visits were found meeting these conditions  Showing recent visits within past 7 days and meeting all other requirements  Today's Visits  Date Type Provider Dept   08/23/22 Telemedicine Carl Wilson MD [facility]   08/23/22 Telephone [facility]   Showing today's visits and meeting all other requirements  Future Appointments  No visits were found meeting these conditions  Showing future appointments within next 150 days and meeting all other requirements     This [facility] and patient was informed that this is a secure, HIPAA-compliant platform  She agrees to proceed  Patient agrees to participate in a virtual check in via telephone or video visit instead of presenting to the office to address urgent/immediate medical needs  Patient is aware this is a billable service  She acknowledged consent and understanding of privacy and security of the video platform  The patient has agreed to participate and understands they can discontinue the visit at any time  After connecting through [facility], the patient was identified by name and date of birth   Shadia Dalton was informed that this was a telemedicine visit and that the exam was being conducted confidentially over secure lines  My office door was closed  No one else was in the room  Shadia Dalton acknowledged consent and understanding of privacy and security of the telemedicine visit  I informed the patient that I have reviewed her record in Epic and presented the opportunity for her to ask any questions regarding the visit today  The patient agreed to participate  Verification of patient location:  Patient is located in the following state in which I hold an active license: [place]    Subjective:   Kamala Kaminski is a 31 y o  female who is concerned about COVID-19  Patient's symptoms include fever, chills, fatigue, nasal congestion, rhinorrhea, sore throat, cough, nausea, vomiting, myalgias and headache  Patient denies shortness of breath, chest tightness and diarrhea  - Date of symptom onset: 8/19/2022      COVID-19 vaccination status: Fully vaccinated with booster    Exposure:   Contact with a person who is under investigation (PUI) for or who is positive for COVID-19 within the last 14 days?: Yes    Hospitalized recently for fever and/or lower respiratory symptoms?: No      Currently a healthcare worker that is involved in direct patient care?: No      Works in a special setting where the risk of COVID-19 transmission may be high? (this may include long-term care, correctional and skilled nursing facilities; homeless shelters; assisted-living facilities and group homes ): Yes      Resident in a special setting where the risk of COVID-19 transmission may be high? (this may include long-term care, correctional and skilled nursing facilities; homeless shelters; assisted-living facilities and group homes ): No      Patient again symptoms late Friday evening  Currently day 4  Still having some low-grade fever and fatigue  Does not feel should be ready to return to work on Thursday although that will be after day 5      Lab Results   Component Value Date    SARSCOV2 Not Detected 05/18/2020     Past Medical History:   Diagnosis Date    Anxiety      Past Surgical History:   Procedure Laterality Date    WISDOM TOOTH EXTRACTION       Current Outpatient Medications   Medication Sig Dispense Refill    ALPRAZolam (XANAX) 0 5 mg tablet Take 0 5 tablets (0 25 mg total) by mouth 2 (two) times a day as needed for anxiety 60 tablet 0    escitalopram (LEXAPRO) 10 mg tablet Take 1 tablet (10 mg total) by mouth daily 90 tablet 0    ondansetron (ZOFRAN) 4 mg tablet Take 1 tablet (4 mg total) by mouth every 8 (eight) hours as needed for nausea or vomiting 20 tablet 0    SUMAtriptan (IMITREX) 25 mg tablet Take 1 tablet (25 mg total) by mouth once as needed for migraine for up to 1 dose 30 tablet 1     No current facility-administered medications for this visit  Allergies   Allergen Reactions    Oxycodone-Acetaminophen     Vicodin [Hydrocodone-Acetaminophen] Rash       Review of Systems   Constitutional: Positive for chills, fatigue and fever  HENT: Positive for congestion, rhinorrhea and sore throat  Respiratory: Positive for cough  Negative for chest tightness and shortness of breath  Gastrointestinal: Positive for nausea and vomiting  Negative for diarrhea  Musculoskeletal: Positive for myalgias  Neurological: Positive for headaches  Objective: There were no vitals filed for this visit  Physical Exam  Constitutional:       General: She is not in acute distress  Appearance: She is ill-appearing  HENT:      Head: Normocephalic and atraumatic  Nose: Congestion present  Pulmonary:      Effort: Pulmonary effort is normal  No respiratory distress  Neurological:      Mental Status: She is alert     Psychiatric:         Behavior: Behavior normal

## 2022-08-23 NOTE — LETTER
August 23, 2022     Patient: Asa Holiday  YOB: 1985  Date of Visit: 8/23/2022      To Whom it May Concern:    Mio Jose is under my professional care  Shadia was seen in my office on 8/23/2022  Shadia may return to work on 8/26/22  She will be medically cleared for recent COVID infection to return on this date  She is still having symptoms and I have advised an extra day beyond the recommended 5 days isolation for vaccinated individuals       If you have any questions or concerns, please don't hesitate to call           Sincerely,          Mounika Macedo MD        CC: No Recipients

## 2022-10-16 DIAGNOSIS — F41.9 ANXIETY: ICD-10-CM

## 2022-10-17 RX ORDER — ALPRAZOLAM 0.5 MG/1
TABLET ORAL
Qty: 60 TABLET | Refills: 0 | Status: SHIPPED | OUTPATIENT
Start: 2022-10-17

## 2022-11-26 DIAGNOSIS — F41.9 ANXIETY: ICD-10-CM

## 2022-11-26 RX ORDER — ESCITALOPRAM OXALATE 10 MG/1
TABLET ORAL
Qty: 90 TABLET | Refills: 0 | Status: SHIPPED | OUTPATIENT
Start: 2022-11-26

## 2022-12-16 DIAGNOSIS — F41.9 ANXIETY: ICD-10-CM

## 2022-12-19 RX ORDER — ALPRAZOLAM 0.5 MG/1
TABLET ORAL
Qty: 60 TABLET | Refills: 0 | Status: SHIPPED | OUTPATIENT
Start: 2022-12-19

## 2022-12-30 DIAGNOSIS — R11.0 NAUSEA: ICD-10-CM

## 2022-12-30 DIAGNOSIS — F41.9 ANXIETY: ICD-10-CM

## 2022-12-30 RX ORDER — ONDANSETRON 4 MG/1
TABLET, FILM COATED ORAL
Qty: 20 TABLET | Refills: 0 | Status: SHIPPED | OUTPATIENT
Start: 2022-12-30

## 2023-01-30 ENCOUNTER — TELEPHONE (OUTPATIENT)
Dept: FAMILY MEDICINE CLINIC | Facility: CLINIC | Age: 38
End: 2023-01-30

## 2023-01-30 DIAGNOSIS — F41.9 ANXIETY: ICD-10-CM

## 2023-01-30 RX ORDER — SERTRALINE HYDROCHLORIDE 25 MG/1
25 TABLET, FILM COATED ORAL DAILY
Qty: 90 TABLET | Refills: 0 | Status: SHIPPED | OUTPATIENT
Start: 2023-01-30 | End: 2023-01-30

## 2023-01-30 NOTE — TELEPHONE ENCOUNTER
Patient called in because she spoke to her OBGYN and they stated that Josefa should switch her Lexapro to zoloft because she's pregnant  Pratik Tran [phone]

## 2023-01-30 NOTE — TELEPHONE ENCOUNTER
Call patient  Stop Lexapro  Start Zoloft as ordered  Take daily  Have her schedule a f/up in 4-6 weeks for medication check

## 2023-02-17 DIAGNOSIS — F41.9 ANXIETY: ICD-10-CM

## 2023-02-17 RX ORDER — ALPRAZOLAM 0.5 MG/1
TABLET ORAL
Qty: 60 TABLET | Refills: 0 | Status: SHIPPED | OUTPATIENT
Start: 2023-02-17

## 2023-03-13 ENCOUNTER — TELEMEDICINE (OUTPATIENT)
Dept: FAMILY MEDICINE CLINIC | Facility: CLINIC | Age: 38
End: 2023-03-13

## 2023-03-13 VITALS — WEIGHT: 160 LBS | HEIGHT: 62 IN | BODY MASS INDEX: 29.44 KG/M2

## 2023-03-13 DIAGNOSIS — Z3A.09 9 WEEKS GESTATION OF PREGNANCY: ICD-10-CM

## 2023-03-13 DIAGNOSIS — F41.9 ANXIETY: Primary | ICD-10-CM

## 2023-03-13 RX ORDER — METOCLOPRAMIDE 5 MG/1
TABLET ORAL
COMMUNITY
Start: 2023-03-10

## 2023-03-13 NOTE — PATIENT INSTRUCTIONS
Continue Zoloft 25 mg po daily as ordered  F/up in 6 months or sooner if needed for medication check  Call with any problems/concerns

## 2023-03-13 NOTE — PROGRESS NOTES
Virtual Regular Visit    Verification of patient location:    Patient is located in the following state in which I hold an active license [place]      Assessment/Plan:    Problem List Items Addressed This Visit        Other    Anxiety - Primary    Relevant Medications    sertraline (ZOLOFT) 25 mg tablet   Other Visit Diagnoses     9 weeks gestation of pregnancy                   Reason for visit is   Chief Complaint   Patient presents with   • Med check   • Virtual Regular Visit        Encounter provider ANDRES Osborn    Provider located at [address] [place] [address]      Recent Visits  Date Type Provider Dept   03/13/23 Telemedicine [facility] [address] [facility] recent visits within past 7 days and meeting all other requirements  Future Appointments  No visits were found meeting these conditions  Showing future appointments within next 150 days and meeting all other requirements       The patient was identified by name and date of birth  Missy Asher was informed that this is a telemedicine visit and that the visit is being conducted through the [address] [place] [address] Now platform  She agrees to proceed     My office door was closed  No one else was in the room  She acknowledged consent and understanding of privacy and security of the video platform  The patient has agreed to participate and understands they can discontinue the visit at any time  Patient is aware this is a billable service  Brandy Found is a 30 y o  female    Medication management for anxiety  Patient was taking lexapro then changed to Zoloft due to pregnancy  Patient is taking Zoloft 25 mg daily-doing well on current dose  She is 9 5 weeks pregnant with twins   No refills       Past Medical History:   Diagnosis Date   • Anxiety        Past Surgical History:   Procedure Laterality Date   • WISDOM TOOTH EXTRACTION Current Outpatient Medications   Medication Sig Dispense Refill   • ALPRAZolam (XANAX) 0 5 mg tablet TAKE 1/2 (ONE-HALF) TABLET BY MOUTH TWICE DAILY AS NEEDED FOR ANXIETY 60 tablet 0   • metoclopramide (REGLAN) 5 mg tablet TAKE 1 TABLET BY MOUTH EVERY 6 HOURS AS NEEDED FOR NAUSEA AND VOMITING     • sertraline (ZOLOFT) 25 mg tablet Take 1 tablet (25 mg total) by mouth daily 90 tablet 0   • SUMAtriptan (IMITREX) 25 mg tablet Take 1 tablet (25 mg total) by mouth once as needed for migraine for up to 1 dose 30 tablet 1     No current facility-administered medications for this visit  Allergies   Allergen Reactions   • Oxycodone-Acetaminophen    • Vicodin [Hydrocodone-Acetaminophen] Rash       Review of Systems   Constitutional: Negative for activity change  Respiratory: Negative for shortness of breath  Cardiovascular: Negative for chest pain and palpitations  Gastrointestinal: Positive for nausea (due to pregnancy)  Neurological: Negative for dizziness and weakness  Psychiatric/Behavioral: Negative for dysphoric mood, self-injury, sleep disturbance and suicidal ideas  The patient is not nervous/anxious  Video Exam    Vitals:    03/13/23 1211   Weight: 72 6 kg (160 lb)   Height: 5' 2" (1 575 m)       Physical Exam  Constitutional:       General: She is not in acute distress  Appearance: Normal appearance  She is not ill-appearing  HENT:      Head: Normocephalic  Pulmonary:      Effort: Pulmonary effort is normal  No respiratory distress  Skin:     Coloration: Skin is not pale  Findings: No erythema  Neurological:      Mental Status: She is alert and oriented to person, place, and time  Psychiatric:         Mood and Affect: Mood normal          Behavior: Behavior normal          Thought Content:  Thought content normal          Judgment: Judgment normal           I spent 15 minutes directly with the patient during this visit

## 2023-03-14 RX ORDER — SERTRALINE HYDROCHLORIDE 25 MG/1
25 TABLET, FILM COATED ORAL DAILY
Qty: 90 TABLET | Refills: 0 | Status: SHIPPED | OUTPATIENT
Start: 2023-03-14

## 2023-03-15 ENCOUNTER — TELEPHONE (OUTPATIENT)
Dept: FAMILY MEDICINE CLINIC | Facility: CLINIC | Age: 38
End: 2023-03-15

## 2023-03-15 NOTE — TELEPHONE ENCOUNTER
Pt is pregnant with twins, her OB wants her to change from Imitrex to fioricet and reglan to [id number] [phone]

## 2023-03-15 NOTE — TELEPHONE ENCOUNTER
Left VM for Pt to call back  Need to inform her that Josefa needs documentation from the [facility] stating the Pt needs these medications changed before the changes can be made due to contraindications

## 2023-03-16 NOTE — TELEPHONE ENCOUNTER
Spoke to patient and requested per Leslee Peterson  that she get something in writing from her OB regarding these medication changes  She has an appointment next week with [facility] and she can also discuss with them whether they can Rx or send something to Josefa  Patient stated she understood and that nothing will be done until we here back from the [facility] or [facility]  She will call to update after discussing with [facility]  Note sent to Leslee Peterson for review

## 2023-11-13 ENCOUNTER — OFFICE VISIT (OUTPATIENT)
Dept: FAMILY MEDICINE CLINIC | Facility: CLINIC | Age: 38
End: 2023-11-13
Payer: COMMERCIAL

## 2023-11-13 VITALS
SYSTOLIC BLOOD PRESSURE: 112 MMHG | BODY MASS INDEX: 26.34 KG/M2 | HEART RATE: 88 BPM | TEMPERATURE: 96.4 F | WEIGHT: 144 LBS | DIASTOLIC BLOOD PRESSURE: 80 MMHG | OXYGEN SATURATION: 100 %

## 2023-11-13 DIAGNOSIS — G43.901 MIGRAINE WITH STATUS MIGRAINOSUS, NOT INTRACTABLE, UNSPECIFIED MIGRAINE TYPE: ICD-10-CM

## 2023-11-13 DIAGNOSIS — F41.9 ANXIETY: ICD-10-CM

## 2023-11-13 DIAGNOSIS — Z00.00 ANNUAL PHYSICAL EXAM: Primary | ICD-10-CM

## 2023-11-13 PROCEDURE — 99395 PREV VISIT EST AGE 18-39: CPT | Performed by: NURSE PRACTITIONER

## 2023-11-13 PROCEDURE — 99214 OFFICE O/P EST MOD 30 MIN: CPT | Performed by: NURSE PRACTITIONER

## 2023-11-13 RX ORDER — SERTRALINE HYDROCHLORIDE 25 MG/1
25 TABLET, FILM COATED ORAL DAILY
Qty: 90 TABLET | Refills: 0 | Status: SHIPPED | OUTPATIENT
Start: 2023-11-13

## 2023-11-13 RX ORDER — SUMATRIPTAN 25 MG/1
25 TABLET, FILM COATED ORAL ONCE AS NEEDED
Qty: 30 TABLET | Refills: 1 | Status: SHIPPED | OUTPATIENT
Start: 2023-11-13

## 2023-11-13 RX ORDER — CEPHALEXIN 500 MG/1
500 CAPSULE ORAL EVERY 12 HOURS SCHEDULED
Qty: 20 CAPSULE | Refills: 0 | Status: SHIPPED | OUTPATIENT
Start: 2023-11-13 | End: 2023-11-23

## 2023-11-13 RX ORDER — ALPRAZOLAM 0.5 MG/1
0.5 TABLET ORAL 2 TIMES DAILY PRN
Qty: 30 TABLET | Refills: 0 | Status: SHIPPED | OUTPATIENT
Start: 2023-11-13

## 2023-11-13 RX ORDER — IBUPROFEN 800 MG/1
800 TABLET ORAL EVERY 8 HOURS PRN
Qty: 60 TABLET | Refills: 0 | Status: SHIPPED | OUTPATIENT
Start: 2023-11-13

## 2023-11-13 NOTE — PATIENT INSTRUCTIONS
Zoloft 25 mg daily  Xanax as ordered prn for anxiety/panic attack  F/up in 4 weeks for med check  Antibiotic as ordered for mastitis  Ibuprofen as prescribed for mastitis pain/inflammation  Call with problems/concerns    Wellness Visit for Adults   AMBULATORY CARE:   A wellness visit  is when you see your healthcare provider to get screened for health problems. Your healthcare provider will also give you advice on how to stay healthy. Write down your questions so you remember to ask them. Ask your healthcare provider how often you should have a wellness visit. What happens at a wellness visit:  Your healthcare provider will ask about your health, and your family history of health problems. This includes high blood pressure, heart disease, and cancer. He or she will ask if you have symptoms that concern you, if you smoke, and about your mood. You may also be asked about your intake of medicines, supplements, food, and alcohol. Any of the following may be done: Your weight  will be checked. Your height may also be checked so your body mass index (BMI) can be calculated. Your BMI shows if you are at a healthy weight. Your blood pressure  and heart rate will be checked. Your temperature may also be checked. Blood and urine tests  may be done. Blood tests may be done to check your cholesterol levels. Abnormal cholesterol levels increase your risk for heart disease and stroke. You may also need a blood or urine test to check for diabetes if you are at increased risk. Urine tests may be done to look for signs of an infection or kidney disease. A physical exam  includes checking your heartbeat and lungs with a stethoscope. Your healthcare provider may also check your skin to look for sun damage. Screening tests  may be recommended. A screening test is done to check for diseases that may not cause symptoms. The screening tests you may need depend on your age, gender, family history, and lifestyle habits.  For example, colorectal screening may be recommended if you are 48years old or older. Screening tests you need if you are a woman:   A Pap smear  is used to screen for cervical cancer. Pap smears are usually done every 3 to 5 years depending on your age. You may need them more often if you have had abnormal Pap smear test results in the past. Ask your healthcare provider how often you should have a Pap smear. A mammogram  is an x-ray of your breasts to screen for breast cancer. Experts recommend mammograms every 2 years starting at age 48 years. You may need a mammogram at age 52 years or younger if you have an increased risk for breast cancer. Talk to your healthcare provider about when you should start having mammograms and how often you need them. Vaccines you may need:   Get an influenza vaccine  every year. The influenza vaccine protects you from the flu. Several types of viruses cause the flu. The viruses change over time, so new vaccines are made each year. Get a tetanus-diphtheria (Td) booster vaccine  every 10 years. This vaccine protects you against tetanus and diphtheria. Tetanus is a severe infection that may cause painful muscle spasms and lockjaw. Diphtheria is a severe bacterial infection that causes a thick covering in the back of your mouth and throat. Get a human papillomavirus (HPV) vaccine  if you are female and aged 23 to 32 or male 23 to 24 and never received it. This vaccine protects you from HPV infection. HPV is the most common infection spread by sexual contact. HPV may also cause vaginal, penile, and anal cancers. Get a pneumococcal vaccine  if you are aged 72 years or older. The pneumococcal vaccine is an injection given to protect you from pneumococcal disease. Pneumococcal disease is an infection caused by pneumococcal bacteria. The infection may cause pneumonia, meningitis, or an ear infection.     Get a shingles vaccine  if you are 60 or older, even if you have had shingles before. The shingles vaccine is an injection to protect you from the varicella-zoster virus. This is the same virus that causes chickenpox. Shingles is a painful rash that develops in people who had chickenpox or have been exposed to the virus. How to eat healthy:  My Plate is a model for planning healthy meals. It shows the types and amounts of foods that should go on your plate. Fruits and vegetables make up about half of your plate, and grains and protein make up the other half. A serving of dairy is included on the side of your plate. The amount of calories and serving sizes you need depends on your age, gender, weight, and height. Examples of healthy foods are listed below:  Eat a variety of vegetables  such as dark green, red, and orange vegetables. You can also include canned vegetables low in sodium (salt) and frozen vegetables without added butter or sauces. Eat a variety of fresh fruits , canned fruit in 100% juice, frozen fruit, and dried fruit. Include whole grains. At least half of the grains you eat should be whole grains. Examples include whole-wheat bread, wheat pasta, brown rice, and whole-grain cereals such as oatmeal.    Eat a variety of protein foods such as seafood (fish and shellfish), lean meat, and poultry without skin (turkey and chicken). Examples of lean meats include pork leg, shoulder, or tenderloin, and beef round, sirloin, tenderloin, and extra lean ground beef. Other protein foods include eggs and egg substitutes, beans, peas, soy products, nuts, and seeds. Choose low-fat dairy products such as skim or 1% milk or low-fat yogurt, cheese, and cottage cheese. Limit unhealthy fats  such as butter, hard margarine, and shortening. Exercise:  Exercise at least 30 minutes per day on most days of the week. Some examples of exercise include walking, biking, dancing, and swimming.  You can also fit in more physical activity by taking the stairs instead of the elevator or parking farther away from stores. Include muscle strengthening activities 2 days each week. Regular exercise provides many health benefits. It helps you manage your weight, and decreases your risk for type 2 diabetes, heart disease, stroke, and high blood pressure. Exercise can also help improve your mood. Ask your healthcare provider about the best exercise plan for you. General health and safety guidelines:   Do not smoke. Nicotine and other chemicals in cigarettes and cigars can cause lung damage. Ask your healthcare provider for information if you currently smoke and need help to quit. E-cigarettes or smokeless tobacco still contain nicotine. Talk to your healthcare provider before you use these products. Limit alcohol. A drink of alcohol is 12 ounces of beer, 5 ounces of wine, or 1½ ounces of liquor. Lose weight, if needed. Being overweight increases your risk of certain health conditions. These include heart disease, high blood pressure, type 2 diabetes, and certain types of cancer. Protect your skin. Do not sunbathe or use tanning beds. Use sunscreen with a SPF 15 or higher. Apply sunscreen at least 15 minutes before you go outside. Reapply sunscreen every 2 hours. Wear protective clothing, hats, and sunglasses when you are outside. Drive safely. Always wear your seatbelt. Make sure everyone in your car wears a seatbelt. A seatbelt can save your life if you are in an accident. Do not use your cell phone when you are driving. This could distract you and cause an accident. Pull over if you need to make a call or send a text message. Practice safe sex. Use latex condoms if are sexually active and have more than one partner. Your healthcare provider may recommend screening tests for sexually transmitted infections (STIs). Wear helmets, lifejackets, and protective gear.   Always wear a helmet when you ride a bike or motorcycle, go skiing, or play sports that could cause a head injury. Wear protective equipment when you play sports. Wear a lifejacket when you are on a boat or doing water sports. © Copyright [employer] 2023 Information is for End User's use only and may not be sold, redistributed or otherwise used for commercial purposes. The above information is an  only. It is not intended as medical advice for individual conditions or treatments. Talk to your doctor, nurse or pharmacist before following any medical regimen to see if it is safe and effective for you.

## 2023-11-13 NOTE — PROGRESS NOTES
[facility]        NAME: Chang Jordan is a 41 y.o. female  : 1985    MRN: [id number]  DATE: 2023  TIME: 11:50 AM    Assessment and Plan   Annual physical exam [K38.45]  5. Annual physical exam        2. Mastitis, postpartum  ibuprofen (MOTRIN) 800 mg tablet    cephalexin (KEFLEX) 500 mg capsule      3. Anxiety  sertraline (ZOLOFT) 25 mg tablet    ALPRAZolam (XANAX) 0.5 mg tablet      4. Migraine with status migrainosus, not intractable, unspecified migraine type  SUMAtriptan (IMITREX) 25 mg tablet            Patient Instructions     Patient Instructions   Zoloft 25 mg daily  Xanax as ordered prn for anxiety/panic attack  F/up in 4 weeks for med check  Antibiotic as ordered for mastitis  Ibuprofen as prescribed for mastitis pain/inflammation  Call with problems/concerns    Wellness Visit for Adults   AMBULATORY CARE:   A wellness visit  is when you see your healthcare provider to get screened for health problems. Your healthcare provider will also give you advice on how to stay healthy. Write down your questions so you remember to ask them. Ask your healthcare provider how often you should have a wellness visit. What happens at a wellness visit:  Your healthcare provider will ask about your health, and your family history of health problems. This includes high blood pressure, heart disease, and cancer. He or she will ask if you have symptoms that concern you, if you smoke, and about your mood. You may also be asked about your intake of medicines, supplements, food, and alcohol. Any of the following may be done: Your weight  will be checked. Your height may also be checked so your body mass index (BMI) can be calculated. Your BMI shows if you are at a healthy weight. Your blood pressure  and heart rate will be checked. Your temperature may also be checked. Blood and urine tests  may be done. Blood tests may be done to check your cholesterol levels.  Abnormal cholesterol levels increase your risk for heart disease and stroke. You may also need a blood or urine test to check for diabetes if you are at increased risk. Urine tests may be done to look for signs of an infection or kidney disease. A physical exam  includes checking your heartbeat and lungs with a stethoscope. Your healthcare provider may also check your skin to look for sun damage. Screening tests  may be recommended. A screening test is done to check for diseases that may not cause symptoms. The screening tests you may need depend on your age, gender, family history, and lifestyle habits. For example, colorectal screening may be recommended if you are 48years old or older. Screening tests you need if you are a woman:   A Pap smear  is used to screen for cervical cancer. Pap smears are usually done every 3 to 5 years depending on your age. You may need them more often if you have had abnormal Pap smear test results in the past. Ask your healthcare provider how often you should have a Pap smear. A mammogram  is an x-ray of your breasts to screen for breast cancer. Experts recommend mammograms every 2 years starting at age 48 years. You may need a mammogram at age 52 years or younger if you have an increased risk for breast cancer. Talk to your healthcare provider about when you should start having mammograms and how often you need them. Vaccines you may need:   Get an influenza vaccine  every year. The influenza vaccine protects you from the flu. Several types of viruses cause the flu. The viruses change over time, so new vaccines are made each year. Get a tetanus-diphtheria (Td) booster vaccine  every 10 years. This vaccine protects you against tetanus and diphtheria. Tetanus is a severe infection that may cause painful muscle spasms and lockjaw. Diphtheria is a severe bacterial infection that causes a thick covering in the back of your mouth and throat.     Get a human papillomavirus (HPV) vaccine  if you are female and aged 23 to 32 or male 23 to 24 and never received it. This vaccine protects you from HPV infection. HPV is the most common infection spread by sexual contact. HPV may also cause vaginal, penile, and anal cancers. Get a pneumococcal vaccine  if you are aged 72 years or older. The pneumococcal vaccine is an injection given to protect you from pneumococcal disease. Pneumococcal disease is an infection caused by pneumococcal bacteria. The infection may cause pneumonia, meningitis, or an ear infection. Get a shingles vaccine  if you are 60 or older, even if you have had shingles before. The shingles vaccine is an injection to protect you from the varicella-zoster virus. This is the same virus that causes chickenpox. Shingles is a painful rash that develops in people who had chickenpox or have been exposed to the virus. How to eat healthy:  My Plate is a model for planning healthy meals. It shows the types and amounts of foods that should go on your plate. Fruits and vegetables make up about half of your plate, and grains and protein make up the other half. A serving of dairy is included on the side of your plate. The amount of calories and serving sizes you need depends on your age, gender, weight, and height. Examples of healthy foods are listed below:  Eat a variety of vegetables  such as dark green, red, and orange vegetables. You can also include canned vegetables low in sodium (salt) and frozen vegetables without added butter or sauces. Eat a variety of fresh fruits , canned fruit in 100% juice, frozen fruit, and dried fruit. Include whole grains. At least half of the grains you eat should be whole grains. Examples include whole-wheat bread, wheat pasta, brown rice, and whole-grain cereals such as oatmeal.    Eat a variety of protein foods such as seafood (fish and shellfish), lean meat, and poultry without skin (turkey and chicken).  Examples of lean meats include pork leg, shoulder, or tenderloin, and beef round, sirloin, tenderloin, and extra lean ground beef. Other protein foods include eggs and egg substitutes, beans, peas, soy products, nuts, and seeds. Choose low-fat dairy products such as skim or 1% milk or low-fat yogurt, cheese, and cottage cheese. Limit unhealthy fats  such as butter, hard margarine, and shortening. Exercise:  Exercise at least 30 minutes per day on most days of the week. Some examples of exercise include walking, biking, dancing, and swimming. You can also fit in more physical activity by taking the stairs instead of the elevator or parking farther away from stores. Include muscle strengthening activities 2 days each week. Regular exercise provides many health benefits. It helps you manage your weight, and decreases your risk for type 2 diabetes, heart disease, stroke, and high blood pressure. Exercise can also help improve your mood. Ask your healthcare provider about the best exercise plan for you. General health and safety guidelines:   Do not smoke. Nicotine and other chemicals in cigarettes and cigars can cause lung damage. Ask your healthcare provider for information if you currently smoke and need help to quit. E-cigarettes or smokeless tobacco still contain nicotine. Talk to your healthcare provider before you use these products. Limit alcohol. A drink of alcohol is 12 ounces of beer, 5 ounces of wine, or 1½ ounces of liquor. Lose weight, if needed. Being overweight increases your risk of certain health conditions. These include heart disease, high blood pressure, type 2 diabetes, and certain types of cancer. Protect your skin. Do not sunbathe or use tanning beds. Use sunscreen with a SPF 15 or higher. Apply sunscreen at least 15 minutes before you go outside. Reapply sunscreen every 2 hours. Wear protective clothing, hats, and sunglasses when you are outside. Drive safely. Always wear your seatbelt.  Make sure everyone in your car wears a seatbelt. A seatbelt can save your life if you are in an accident. Do not use your cell phone when you are driving. This could distract you and cause an accident. Pull over if you need to make a call or send a text message. Practice safe sex. Use latex condoms if are sexually active and have more than one partner. Your healthcare provider may recommend screening tests for sexually transmitted infections (STIs). Wear helmets, lifejackets, and protective gear. Always wear a helmet when you ride a bike or motorcycle, go skiing, or play sports that could cause a head injury. Wear protective equipment when you play sports. Wear a lifejacket when you are on a boat or doing water sports. © Copyright [employer] 2023 Information is for End User's use only and may not be sold, redistributed or otherwise used for commercial purposes. The above information is an  only. It is not intended as medical advice for individual conditions or treatments. Talk to your doctor, nurse or pharmacist before following any medical regimen to see if it is safe and effective for you. Chief Complaint     Chief Complaint   Patient presents with    Physical Exam    Depression         History of Present Illness       Patient was being treated for depression before pregnancy. She stopped taking medication during pregnancy. She is 9 weeks post partum. Her depression screening is positive for moderate depression, denies suicidal thoughts, no self harm. Would like to restart Zoloft. She is prescribed xanax prn for anxiety. C/o mastitis-She stop breast feeding-breast are painful, swollen. Hx of migraines-needs refill for Imitrex. Review of Systems   Review of Systems   Constitutional:  Negative for activity change, diaphoresis and fever. HENT: Negative. Respiratory:  Negative for chest tightness and shortness of breath. Cardiovascular:  Negative for chest pain. Gastrointestinal:  Negative for abdominal pain. Genitourinary:  Negative for difficulty urinating. Skin:  Negative for color change and pallor. mastitis   Neurological:  Negative for dizziness and weakness. Psychiatric/Behavioral:  Positive for dysphoric mood. Current Medications       Current Outpatient Medications:     ALPRAZolam (XANAX) 0.5 mg tablet, Take 1 tablet (0.5 mg total) by mouth 2 (two) times a day as needed for anxiety, Disp: 30 tablet, Rfl: 0    cephalexin (KEFLEX) 500 mg capsule, Take 1 capsule (500 mg total) by mouth every 12 (twelve) hours for 10 days, Disp: 20 capsule, Rfl: 0    ibuprofen (MOTRIN) 800 mg tablet, Take 1 tablet (800 mg total) by mouth every 8 (eight) hours as needed for mild pain, Disp: 60 tablet, Rfl: 0    sertraline (ZOLOFT) 25 mg tablet, Take 1 tablet (25 mg total) by mouth daily, Disp: 90 tablet, Rfl: 0    SUMAtriptan (IMITREX) 25 mg tablet, Take 1 tablet (25 mg total) by mouth once as needed for migraine for up to 60 doses, Disp: 30 tablet, Rfl: 1    metoclopramide (REGLAN) 5 mg tablet, TAKE 1 TABLET BY MOUTH EVERY 6 HOURS AS NEEDED FOR NAUSEA AND VOMITING (Patient not taking: Reported on 2023), Disp: , Rfl:     Current Allergies     Allergies as of 2023 - Reviewed 2023   Allergen Reaction Noted    Oxycodone-acetaminophen  2016    Vicodin [hydrocodone-acetaminophen] Rash 2017            The following portions of the patient's history were reviewed and updated as appropriate: allergies, current medications, past family history, past medical history, past social history, past surgical history and problem list.     Past Medical History:   Diagnosis Date    Anxiety        Past Surgical History:   Procedure Laterality Date     SECTION  2023    WISDOM TOOTH EXTRACTION         Family History   Problem Relation Age of Onset    Heart disease Mother          Medications have been verified.         Objective   /80 (BP Location: Left arm, Patient Position: Sitting, Cuff Size: Standard)   Pulse 88   Temp (!) 96.4 °F (35.8 °C) (Tympanic)   Wt 65.3 kg (144 lb)   SpO2 100%   BMI 26.34 kg/m²        Physical Exam     Physical Exam  Vitals and nursing note reviewed. Constitutional:       General: She is not in acute distress. Appearance: Normal appearance. She is not ill-appearing or diaphoretic. HENT:      Head: Normocephalic. Eyes:      Extraocular Movements: Extraocular movements intact. Cardiovascular:      Rate and Rhythm: Regular rhythm. Heart sounds: Normal heart sounds. No murmur heard. No gallop. Pulmonary:      Effort: Pulmonary effort is normal. No respiratory distress. Breath sounds: Normal breath sounds. Abdominal:      General: Bowel sounds are normal. There is no distension. Palpations: Abdomen is soft. Tenderness: There is no abdominal tenderness. Musculoskeletal:         General: Normal range of motion. Cervical back: Normal range of motion. Skin:     General: Skin is warm and dry. Neurological:      Mental Status: She is alert and oriented to person, place, and time. Psychiatric:         Mood and Affect: Mood normal.         Behavior: Behavior normal.         Thought Content:  Thought content normal.         Judgment: Judgment normal.             PHQ-2/9 Depression Screening    Little interest or pleasure in doing things: 2 - more than half the days  Feeling down, depressed, or hopeless: 3 - nearly every day  Trouble falling or staying asleep, or sleeping too much: 3 - nearly every day  Feeling tired or having little energy: 3 - nearly every day  Poor appetite or overeatin - not at all  Feeling bad about yourself - or that you are a failure or have let yourself or your family down: 1 - several days  Trouble concentrating on things, such as reading the newspaper or watching television: 0 - not at all  Moving or speaking so slowly that other people could have noticed. Or the opposite - being so fidgety or restless that you have been moving around a lot more than usual: 0 - not at all  Thoughts that you would be better off dead, or of hurting yourself in some way: 0 - not at all  PHQ-9 Score: 12   PHQ-9 Interpretation: Moderate depression          Depression Screening Follow-up Plan: Patient's depression screening was positive with a PHQ-2 score of . Their PHQ-9 score was 12. Patient assessed for underlying major depression. They have no active suicidal ideations. Brief counseling provided and recommend additional follow-up/re-evaluation next office visit.

## 2023-11-13 NOTE — PROGRESS NOTES
ADULT ANNUAL [facility]    NAME: Analia Dalton  AGE: 41 y.o. SEX: female  : 1985     DATE: 2023     Assessment and Plan:     Problem List Items Addressed This Visit          Other    Anxiety    Relevant Medications    sertraline (ZOLOFT) 25 mg tablet    ALPRAZolam (XANAX) 0.5 mg tablet     Other Visit Diagnoses       Annual physical exam    -  Primary    Mastitis, postpartum        Relevant Medications    ibuprofen (MOTRIN) 800 mg tablet    cephalexin (KEFLEX) 500 mg capsule    Migraine with status migrainosus, not intractable, unspecified migraine type        Relevant Medications    ibuprofen (MOTRIN) 800 mg tablet    sertraline (ZOLOFT) 25 mg tablet    SUMAtriptan (IMITREX) 25 mg tablet            Immunizations and preventive care screenings were discussed with patient today. Appropriate education was printed on patient's after visit summary. Counseling:  Alcohol/drug use: discussed moderation in alcohol intake, the recommendations for healthy alcohol use, and avoidance of illicit drug use. Dental Health: discussed importance of regular tooth brushing, flossing, and dental visits. Injury prevention: discussed safety/seat belts, safety helmets, smoke detectors, carbon dioxide detectors, and smoking near bedding or upholstery. Sexual health: discussed sexually transmitted diseases, partner selection, use of condoms, avoidance of unintended pregnancy, and contraceptive alternatives. Exercise: the importance of regular exercise/physical activity was discussed. Recommend exercise 3-5 times per week for at least 30 minutes. No follow-ups on file.      Chief Complaint:     Chief Complaint   Patient presents with    Physical Exam    Depression      History of Present Illness:     Adult Annual Physical   Patient here for a comprehensive physical exam. The patient reports problems - depression, mastitis .    Diet and Physical Activity  Diet/Nutrition: well balanced diet. Exercise: no formal exercise. Depression Screening  PHQ-2/9 Depression Screening    Little interest or pleasure in doing things: 2 - more than half the days  Feeling down, depressed, or hopeless: 3 - nearly every day  Trouble falling or staying asleep, or sleeping too much: 3 - nearly every day  Feeling tired or having little energy: 3 - nearly every day  Poor appetite or overeatin - not at all  Feeling bad about yourself - or that you are a failure or have let yourself or your family down: 1 - several days  Trouble concentrating on things, such as reading the newspaper or watching television: 0 - not at all  Moving or speaking so slowly that other people could have noticed. Or the opposite - being so fidgety or restless that you have been moving around a lot more than usual: 0 - not at all  Thoughts that you would be better off dead, or of hurting yourself in some way: 0 - not at all  PHQ-9 Score: 12   PHQ-9 Interpretation: Moderate depression          General Health  Sleep: gets 4-6 hours of sleep on average and has 9 weeks twins . Hearing: normal - bilateral.  Vision: goes for regular eye exams. Dental: regular dental visits. /GYN Health  Last menstrual period: 9 weeks post partum  Contraceptive method:  none . History of STDs?: no.    Advanced Care Planning  Do you have an advanced directive? no  Do you have a durable medical power of ? no     Review of Systems:     Review of Systems   Constitutional:  Negative for activity change, appetite change, chills, diaphoresis, fatigue and fever. HENT:  Negative for congestion, dental problem, drooling, ear discharge, ear pain, facial swelling, hearing loss, mouth sores, nosebleeds, postnasal drip, rhinorrhea, sinus pressure, sinus pain, sneezing, sore throat, tinnitus, trouble swallowing and voice change.     Eyes:  Negative for photophobia, pain, discharge, redness, itching and visual disturbance. Respiratory:  Negative for apnea, cough, choking, chest tightness, shortness of breath, wheezing and stridor. Cardiovascular:  Negative for chest pain, palpitations and leg swelling. Gastrointestinal:  Negative for abdominal distention, abdominal pain, anal bleeding, blood in stool, constipation, diarrhea, nausea, rectal pain and vomiting. Endocrine: Negative for cold intolerance, heat intolerance, polydipsia, polyphagia and polyuria. Genitourinary:  Negative for decreased urine volume, difficulty urinating, dysuria, flank pain, frequency, hematuria, pelvic pain and urgency. Musculoskeletal:  Negative for arthralgias, back pain, gait problem, joint swelling, myalgias, neck pain and neck stiffness. Skin:  Negative for color change, pallor, rash and wound. Neurological:  Negative for dizziness, tremors, seizures, syncope, facial asymmetry, speech difficulty, weakness, light-headedness, numbness and headaches. Hematological:  Negative for adenopathy. Does not bruise/bleed easily. Psychiatric/Behavioral:  Positive for dysphoric mood. Negative for agitation, behavioral problems, confusion, decreased concentration, hallucinations, self-injury, sleep disturbance and suicidal ideas. The patient is nervous/anxious. The patient is not hyperactive. Past Medical History:     Past Medical History:   Diagnosis Date    Anxiety       Past Surgical History:     Past Surgical History:   Procedure Laterality Date     SECTION  2023    WISDOM TOOTH EXTRACTION        Social History:     Social History     Socioeconomic History    Marital status:      Spouse name: None    Number of children: 2    Years of education: None    Highest education level: None   Occupational History    Occupation: Finance   Tobacco Use    Smoking status: Never    Smokeless tobacco: Never   Vaping Use    Vaping Use: Never used   Substance and Sexual Activity    Alcohol use:  No Drug use: Not Currently     Types: Marijuana    Sexual activity: Yes   Other Topics Concern    None   Social History Narrative    None     Social Determinants of Health     Financial Resource Strain: Not on file   Food Insecurity: Not on file   Transportation Needs: Not on file   Physical Activity: Not on file   Stress: Not on file   Social Connections: Not on file   Intimate Partner Violence: Not on file   Housing Stability: Not on file      Family History:     Family History   Problem Relation Age of Onset    Heart disease Mother       Current Medications:     Current Outpatient Medications   Medication Sig Dispense Refill    ALPRAZolam (XANAX) 0.5 mg tablet Take 1 tablet (0.5 mg total) by mouth 2 (two) times a day as needed for anxiety 30 tablet 0    cephalexin (KEFLEX) 500 mg capsule Take 1 capsule (500 mg total) by mouth every 12 (twelve) hours for 10 days 20 capsule 0    ibuprofen (MOTRIN) 800 mg tablet Take 1 tablet (800 mg total) by mouth every 8 (eight) hours as needed for mild pain 60 tablet 0    sertraline (ZOLOFT) 25 mg tablet Take 1 tablet (25 mg total) by mouth daily 90 tablet 0    SUMAtriptan (IMITREX) 25 mg tablet Take 1 tablet (25 mg total) by mouth once as needed for migraine for up to 60 doses 30 tablet 1    metoclopramide (REGLAN) 5 mg tablet TAKE 1 TABLET BY MOUTH EVERY 6 HOURS AS NEEDED FOR NAUSEA AND VOMITING (Patient not taking: Reported on 11/13/2023)       No current facility-administered medications for this visit. Allergies: Allergies   Allergen Reactions    Oxycodone-Acetaminophen     Vicodin [Hydrocodone-Acetaminophen] Rash      Physical Exam:     /80 (BP Location: Left arm, Patient Position: Sitting, Cuff Size: Standard)   Pulse 88   Temp (!) 96.4 °F (35.8 °C) (Tympanic)   Wt 65.3 kg (144 lb)   SpO2 100%   BMI 26.34 kg/m²     Physical Exam  Vitals and nursing note reviewed. Constitutional:       General: She is not in acute distress.      Appearance: Normal appearance. She is normal weight. She is not ill-appearing, toxic-appearing or diaphoretic. HENT:      Head: Normocephalic. Right Ear: Tympanic membrane, ear canal and external ear normal. There is no impacted cerumen. Left Ear: Tympanic membrane, ear canal and external ear normal. There is no impacted cerumen. Nose: Nose normal. No congestion or rhinorrhea. Mouth/Throat:      Mouth: Mucous membranes are moist.      Pharynx: Oropharynx is clear. No oropharyngeal exudate or posterior oropharyngeal erythema. Eyes:      General: No scleral icterus. Right eye: No discharge. Left eye: No discharge. Extraocular Movements: Extraocular movements intact. Conjunctiva/sclera: Conjunctivae normal.      Pupils: Pupils are equal, round, and reactive to light. Neck:      Vascular: No carotid bruit. Cardiovascular:      Rate and Rhythm: Normal rate and regular rhythm. Pulses: Normal pulses. Heart sounds: Normal heart sounds. No murmur heard. No friction rub. No gallop. Pulmonary:      Effort: Pulmonary effort is normal. No respiratory distress. Breath sounds: Normal breath sounds. No stridor. No wheezing, rhonchi or rales. Chest:      Chest wall: No tenderness. Abdominal:      General: Abdomen is flat. Bowel sounds are normal. There is no distension. Palpations: Abdomen is soft. There is no mass. Tenderness: There is no abdominal tenderness. There is no right CVA tenderness, left CVA tenderness, guarding or rebound. Hernia: No hernia is present. Musculoskeletal:         General: No swelling, tenderness, deformity or signs of injury. Normal range of motion. Cervical back: Normal range of motion and neck supple. No rigidity or tenderness. No muscular tenderness. Right lower leg: No edema. Left lower leg: No edema. Lymphadenopathy:      Cervical: No cervical adenopathy. Skin:     General: Skin is warm.       Coloration: Skin is not jaundiced or pale. Findings: No bruising, erythema, lesion or rash. Neurological:      General: No focal deficit present. Mental Status: She is alert and oriented to person, place, and time. Cranial Nerves: No cranial nerve deficit. Sensory: No sensory deficit. Motor: No weakness. Coordination: Coordination normal.      Gait: Gait normal.      Deep Tendon Reflexes: Reflexes normal.   Psychiatric:         Mood and Affect: Mood normal.         Behavior: Behavior normal.         Thought Content:  Thought content normal.         Judgment: Judgment normal.          Tammy Claudeen Joseph, [address] [facility]

## 2023-12-18 ENCOUNTER — TELEMEDICINE (OUTPATIENT)
Dept: FAMILY MEDICINE CLINIC | Facility: CLINIC | Age: 38
End: 2023-12-18
Payer: COMMERCIAL

## 2023-12-18 DIAGNOSIS — F41.9 ANXIETY: ICD-10-CM

## 2023-12-18 PROCEDURE — 99213 OFFICE O/P EST LOW 20 MIN: CPT | Performed by: NURSE PRACTITIONER

## 2023-12-18 RX ORDER — SERTRALINE HYDROCHLORIDE 25 MG/1
25 TABLET, FILM COATED ORAL DAILY
Qty: 90 TABLET | Refills: 1 | Status: SHIPPED | OUTPATIENT
Start: 2023-12-18

## 2023-12-18 RX ORDER — ALPRAZOLAM 0.5 MG/1
0.5 TABLET ORAL 2 TIMES DAILY PRN
Qty: 30 TABLET | Refills: 0 | Status: SHIPPED | OUTPATIENT
Start: 2023-12-18

## 2023-12-18 NOTE — PATIENT INSTRUCTIONS
Continue Zoloft 25 mg daily as ordered  Continue Xanax prn as ordered for anxiety  F/up in 6 months

## 2024-01-23 DIAGNOSIS — F41.9 ANXIETY: ICD-10-CM

## 2024-01-25 RX ORDER — ALPRAZOLAM 0.5 MG/1
0.5 TABLET ORAL 2 TIMES DAILY PRN
Qty: 30 TABLET | Refills: 0 | Status: SHIPPED | OUTPATIENT
Start: 2024-01-25

## 2024-02-18 DIAGNOSIS — F41.9 ANXIETY: ICD-10-CM

## 2024-02-19 RX ORDER — ALPRAZOLAM 0.5 MG/1
0.5 TABLET ORAL 2 TIMES DAILY PRN
Qty: 30 TABLET | Refills: 5 | Status: SHIPPED | OUTPATIENT
Start: 2024-02-19

## 2024-02-21 ENCOUNTER — OFFICE VISIT (OUTPATIENT)
Dept: FAMILY MEDICINE CLINIC | Facility: CLINIC | Age: 39
End: 2024-02-21
Payer: COMMERCIAL

## 2024-02-21 VITALS
TEMPERATURE: 97.7 F | SYSTOLIC BLOOD PRESSURE: 118 MMHG | OXYGEN SATURATION: 98 % | HEART RATE: 78 BPM | DIASTOLIC BLOOD PRESSURE: 80 MMHG

## 2024-02-21 DIAGNOSIS — Z20.828 EXPOSURE TO THE FLU: ICD-10-CM

## 2024-02-21 DIAGNOSIS — R68.89 FLU-LIKE SYMPTOMS: Primary | ICD-10-CM

## 2024-02-21 DIAGNOSIS — R05.1 ACUTE COUGH: ICD-10-CM

## 2024-02-21 PROCEDURE — 99213 OFFICE O/P EST LOW 20 MIN: CPT | Performed by: NURSE PRACTITIONER

## 2024-02-21 RX ORDER — ONDANSETRON 4 MG/1
TABLET, ORALLY DISINTEGRATING ORAL
COMMUNITY
Start: 2024-02-15

## 2024-02-21 RX ORDER — CODEINE PHOSPHATE/GUAIFENESIN 10-100MG/5
5 LIQUID (ML) ORAL
Qty: 118 ML | Refills: 0 | Status: SHIPPED | OUTPATIENT
Start: 2024-02-21

## 2024-02-21 NOTE — LETTER
February 21, 2024    Patient: Shadia Dalton  YOB: 1985  Date of Last Encounter: 2/18/2024      To whom it may concern:     Shadia Dalton has tested positive for COVID-19 (Coronavirus). She may return to work on 2/22/24, which is 10 days from illness onset (provided symptoms are improving) and 24 hours without fever.    Sincerely,         ANDRES Manning

## 2024-02-21 NOTE — PROGRESS NOTES
[facility]        NAME: Shadia Dalton is a 38 y.o. female  : 1985    MRN: [id number]  DATE: 2024  TIME: 12:09 PM    Assessment and Plan   Flu-like symptoms [R68.89]  1. Flu-like symptoms        2. Acute cough  guaifenesin-codeine (GUAIFENESIN AC) 100-10 MG/5ML liquid      3. Exposure to the flu              Patient Instructions     Patient Instructions   Increase fluids/rest  OTC cold/cough medications as directed to treat symptoms.  Note given to return to work.        Chief Complaint     Chief Complaint   Patient presents with    Cough     4 days -- Daughter tested positive to flu A today -- temp was 102F on Saturday     Sore Throat         History of Present Illness       C/o fever over weekend, sore throat, clogged ears, chest congestion, Daughter tested positive for flu this am. Using OTC medications with some relief. Cough is worse at night. States syptoms are improving, no fever at present, needs note to return to work.        Review of Systems   Review of Systems   Constitutional:  Positive for fatigue. Negative for activity change, diaphoresis and fever.   HENT:  Positive for congestion, ear pain, rhinorrhea and sore throat. Negative for facial swelling, hearing loss, sinus pressure, sinus pain, sneezing and voice change.    Eyes:  Negative for discharge and visual disturbance.   Respiratory:  Positive for cough. Negative for choking, chest tightness, shortness of breath, wheezing and stridor.    Cardiovascular:  Negative for chest pain, palpitations and leg swelling.   Gastrointestinal:  Negative for abdominal distention, abdominal pain, constipation, diarrhea, nausea and vomiting.   Endocrine: Negative for polydipsia, polyphagia and polyuria.   Genitourinary:  Negative for difficulty urinating, dysuria, frequency and urgency.   Musculoskeletal:  Negative for arthralgias, back pain, gait problem, joint swelling, myalgias, neck pain and neck stiffness.    Skin:  Negative for color change, rash and wound.   Neurological:  Positive for headaches. Negative for dizziness, syncope, speech difficulty, weakness and light-headedness.   Hematological:  Negative for adenopathy. Does not bruise/bleed easily.   Psychiatric/Behavioral:  Negative for agitation, behavioral problems, confusion, hallucinations, sleep disturbance and suicidal ideas. The patient is not nervous/anxious.          Current Medications       Current Outpatient Medications:     guaifenesin-codeine (GUAIFENESIN AC) 100-10 MG/5ML liquid, Take 5 mL by mouth daily at bedtime as needed for cough, Disp: 118 mL, Rfl: 0    ondansetron (ZOFRAN-ODT) 4 mg disintegrating tablet, DISSOLVE 1 TABLET IN MOUTH EVERY 8 HOURS AS NEEDED FOR NAUSEA AND VOMITING, Disp: , Rfl:     ALPRAZolam (XANAX) 0.5 mg tablet, Take 1 tablet by mouth twice daily as needed for anxiety, Disp: 30 tablet, Rfl: 5    sertraline (ZOLOFT) 25 mg tablet, Take 1 tablet (25 mg total) by mouth daily, Disp: 90 tablet, Rfl: 1    SUMAtriptan (IMITREX) 25 mg tablet, Take 1 tablet (25 mg total) by mouth once as needed for migraine for up to 60 doses, Disp: 30 tablet, Rfl: 1    Current Allergies     Allergies as of 2024 - Reviewed 2024   Allergen Reaction Noted    Oxycodone-acetaminophen  2016    Vicodin [hydrocodone-acetaminophen] Rash 2017            The following portions of the patient's history were reviewed and updated as appropriate: allergies, current medications, past family history, past medical history, past social history, past surgical history and problem list.     Past Medical History:   Diagnosis Date    Anxiety        Past Surgical History:   Procedure Laterality Date     SECTION  2023    WISDOM TOOTH EXTRACTION         Family History   Problem Relation Age of Onset    Heart disease Mother          Medications have been verified.        Objective   /80   Pulse 78   Temp 97.7 °F (36.5 °C) (Tympanic)    SpO2 98%        Physical Exam     Physical Exam  Vitals and nursing note reviewed.   Constitutional:       General: She is not in acute distress.     Appearance: Normal appearance. She is well-developed and normal weight. She is not ill-appearing or diaphoretic.   HENT:      Head: Normocephalic and atraumatic.      Right Ear: Tympanic membrane, ear canal and external ear normal. No drainage, swelling or tenderness. No middle ear effusion. Tympanic membrane is not erythematous.      Left Ear: Tympanic membrane, ear canal and external ear normal. No drainage, swelling or tenderness.  No middle ear effusion. Tympanic membrane is not erythematous.      Nose: Congestion and rhinorrhea present.      Right Sinus: No maxillary sinus tenderness or frontal sinus tenderness.      Left Sinus: No maxillary sinus tenderness or frontal sinus tenderness.      Mouth/Throat:      Mouth: Mucous membranes are moist. No oral lesions.      Pharynx: Uvula midline. No oropharyngeal exudate or posterior oropharyngeal erythema.      Tonsils: No tonsillar exudate or tonsillar abscesses.   Eyes:      General:         Right eye: No discharge.         Left eye: No discharge.      Conjunctiva/sclera: Conjunctivae normal.      Pupils: Pupils are equal, round, and reactive to light.   Neck:      Thyroid: No thyromegaly.      Trachea: No tracheal deviation.   Cardiovascular:      Rate and Rhythm: Normal rate and regular rhythm.      Pulses: Normal pulses.      Heart sounds: Normal heart sounds. No murmur heard.     No friction rub. No gallop.   Pulmonary:      Effort: Pulmonary effort is normal. No respiratory distress.      Breath sounds: Normal breath sounds. No stridor. No wheezing, rhonchi or rales.   Chest:      Chest wall: No tenderness.   Abdominal:      General: Bowel sounds are normal. There is no distension.      Palpations: Abdomen is soft. There is no mass.      Tenderness: There is no abdominal tenderness. There is no guarding or  rebound.   Musculoskeletal:         General: No swelling, tenderness, deformity or signs of injury. Normal range of motion.      Cervical back: Normal range of motion and neck supple.      Right lower leg: No edema.      Left lower leg: No edema.   Lymphadenopathy:      Cervical: No cervical adenopathy.   Skin:     General: Skin is warm and dry.      Capillary Refill: Capillary refill takes less than 2 seconds.      Coloration: Skin is not jaundiced or pale.      Findings: No bruising, erythema, lesion or rash.   Neurological:      Mental Status: She is alert and oriented to person, place, and time.      Cranial Nerves: No cranial nerve deficit.      Sensory: No sensory deficit.      Motor: No weakness.      Coordination: Coordination normal.      Gait: Gait normal.   Psychiatric:         Mood and Affect: Mood normal.         Speech: Speech normal.         Behavior: Behavior normal.         Thought Content: Thought content normal.         Judgment: Judgment normal.

## 2024-02-21 NOTE — PATIENT INSTRUCTIONS
Increase fluids/rest  OTC cold/cough medications as directed to treat symptoms.  Note given to return to work.

## 2024-02-21 NOTE — LETTER
February 21, 2024     Patient: Shadia Dalton  YOB: 1985  Date of Visit: 2/21/2024      To Whom it May Concern:    Shadia Dalton is under my professional care. Shadia was seen in my office on 2/21/2024. Shadia may return to work on 2/23/24 .    If you have any questions or concerns, please don't hesitate to call.         Sincerely,          ANDRES Manning        CC: No Recipients

## 2024-03-20 DIAGNOSIS — J01.00 ACUTE NON-RECURRENT MAXILLARY SINUSITIS: Primary | ICD-10-CM

## 2024-03-20 RX ORDER — AZITHROMYCIN 250 MG/1
TABLET, FILM COATED ORAL DAILY
Qty: 6 TABLET | Refills: 0 | Status: SHIPPED | OUTPATIENT
Start: 2024-03-20 | End: 2024-03-25

## 2024-08-09 DIAGNOSIS — F41.9 ANXIETY: ICD-10-CM

## 2024-08-09 RX ORDER — ALPRAZOLAM 0.5 MG/1
0.5 TABLET ORAL 2 TIMES DAILY PRN
Qty: 30 TABLET | Refills: 0 | Status: SHIPPED | OUTPATIENT
Start: 2024-08-09

## 2024-08-30 DIAGNOSIS — F41.9 ANXIETY: ICD-10-CM

## 2024-08-30 RX ORDER — SERTRALINE HYDROCHLORIDE 25 MG/1
25 TABLET, FILM COATED ORAL DAILY
Qty: 90 TABLET | Refills: 1 | Status: SHIPPED | OUTPATIENT
Start: 2024-08-30

## 2024-09-06 DIAGNOSIS — F41.9 ANXIETY: ICD-10-CM

## 2024-09-06 RX ORDER — ALPRAZOLAM 0.5 MG
0.5 TABLET ORAL 2 TIMES DAILY PRN
Qty: 30 TABLET | Refills: 0 | Status: SHIPPED | OUTPATIENT
Start: 2024-09-06

## 2024-09-20 ENCOUNTER — OFFICE VISIT (OUTPATIENT)
Dept: FAMILY MEDICINE CLINIC | Facility: CLINIC | Age: 39
End: 2024-09-20
Payer: COMMERCIAL

## 2024-09-20 VITALS
SYSTOLIC BLOOD PRESSURE: 120 MMHG | HEART RATE: 82 BPM | OXYGEN SATURATION: 98 % | BODY MASS INDEX: 30.91 KG/M2 | WEIGHT: 169 LBS | DIASTOLIC BLOOD PRESSURE: 82 MMHG

## 2024-09-20 DIAGNOSIS — F41.9 ANXIETY: Primary | ICD-10-CM

## 2024-09-20 DIAGNOSIS — G43.901 MIGRAINE WITH STATUS MIGRAINOSUS, NOT INTRACTABLE, UNSPECIFIED MIGRAINE TYPE: ICD-10-CM

## 2024-09-20 PROBLEM — Q79.0 PREGNANCY COMPLICATED BY CONGENITAL DIAPHRAGMATIC HERNIA: Status: RESOLVED | Noted: 2024-09-20 | Resolved: 2024-09-20

## 2024-09-20 PROBLEM — Q79.0 PREGNANCY COMPLICATED BY CONGENITAL DIAPHRAGMATIC HERNIA: Status: ACTIVE | Noted: 2024-09-20

## 2024-09-20 PROBLEM — O99.891 PREGNANCY COMPLICATED BY CONGENITAL DIAPHRAGMATIC HERNIA: Status: RESOLVED | Noted: 2024-09-20 | Resolved: 2024-09-20

## 2024-09-20 PROBLEM — O99.891 PREGNANCY COMPLICATED BY CONGENITAL DIAPHRAGMATIC HERNIA: Status: ACTIVE | Noted: 2024-09-20

## 2024-09-20 PROCEDURE — 99214 OFFICE O/P EST MOD 30 MIN: CPT | Performed by: NURSE PRACTITIONER

## 2024-09-20 RX ORDER — SERTRALINE HYDROCHLORIDE 25 MG/1
25 TABLET, FILM COATED ORAL DAILY
Qty: 90 TABLET | Refills: 1 | Status: SHIPPED | OUTPATIENT
Start: 2024-09-20

## 2024-09-20 RX ORDER — TOPIRAMATE 25 MG/1
25 TABLET, FILM COATED ORAL 2 TIMES DAILY
Qty: 180 TABLET | Refills: 0 | Status: SHIPPED | OUTPATIENT
Start: 2024-09-20

## 2024-09-20 NOTE — PROGRESS NOTES
Ambulatory Visit  Name: Shadia Dalton      : 1985      MRN: [id number]  Encounter Provider: ANDRES Manning  Encounter Date: 2024   Encounter department: [facility]    Assessment & Plan  Anxiety    Orders:    sertraline (ZOLOFT) 25 mg tablet; Take 1 tablet (25 mg total) by mouth daily    Migraine with status migrainosus, not intractable, unspecified migraine type  Migraines have increased since stopping birth control. Occurs 3-4x a month having to take multiple doses of imitrex for relief. Will trial topamax for migraine prophylaxis.    Orders:    topiramate (Topamax) 25 mg tablet; Take 1 tablet (25 mg total) by mouth 2 (two) times a day       History of Present Illness     Here for medication management.        History obtained from : patient  Review of Systems   Constitutional:  Negative for activity change and fever.   Respiratory:  Negative for chest tightness, shortness of breath and wheezing.    Cardiovascular:  Negative for chest pain.   Gastrointestinal:  Negative for abdominal pain.   Neurological:  Positive for headaches. Negative for dizziness, tremors, seizures, syncope, facial asymmetry, speech difficulty, weakness, light-headedness and numbness.   Psychiatric/Behavioral:  Negative for dysphoric mood.      Medical History Reviewed by provider this encounter:  Tobacco  Allergies  Meds  Problems  Med Hx  Surg Hx  Fam Hx       Current Outpatient Medications on File Prior to Visit   Medication Sig Dispense Refill    ALPRAZolam (XANAX) 0.5 mg tablet Take 1 tablet by mouth twice daily as needed for anxiety 30 tablet 0    ondansetron (ZOFRAN-ODT) 4 mg disintegrating tablet DISSOLVE 1 TABLET IN MOUTH EVERY 8 HOURS AS NEEDED FOR NAUSEA AND VOMITING      SUMAtriptan (IMITREX) 25 mg tablet Take 1 tablet (25 mg total) by mouth once as needed for migraine for up to 60 doses 30 tablet 1    [DISCONTINUED] sertraline (ZOLOFT) 25 mg tablet Take 1 tablet by  mouth once daily 90 tablet 1    guaifenesin-codeine (GUAIFENESIN AC) 100-10 MG/5ML liquid Take 5 mL by mouth daily at bedtime as needed for cough (Patient not taking: Reported on 9/20/2024) 118 mL 0     No current facility-administered medications on file prior to visit.          Objective     /82 (BP Location: Left arm, Patient Position: Sitting, Cuff Size: Standard)   Pulse 82   Wt 76.7 kg (169 lb)   LMP  (LMP Unknown)   SpO2 98%   BMI 30.91 kg/m²     Physical Exam  Vitals and nursing note reviewed.   Constitutional:       General: She is not in acute distress.     Appearance: Normal appearance. She is not ill-appearing.   HENT:      Head: Normocephalic.   Eyes:      Extraocular Movements: Extraocular movements intact.      Conjunctiva/sclera: Conjunctivae normal.   Cardiovascular:      Rate and Rhythm: Normal rate and regular rhythm.      Heart sounds: Normal heart sounds.   Pulmonary:      Effort: Pulmonary effort is normal. No respiratory distress.      Breath sounds: Normal breath sounds.   Musculoskeletal:         General: Normal range of motion.   Skin:     General: Skin is warm and dry.      Coloration: Skin is not pale.      Findings: No erythema.   Neurological:      Mental Status: She is alert and oriented to person, place, and time.   Psychiatric:         Mood and Affect: Mood normal.         Behavior: Behavior normal.

## 2024-09-20 NOTE — ASSESSMENT & PLAN NOTE
Migraines have increased since stopping birth control. Occurs 3-4x a month having to take multiple doses of imitrex for relief. Will trial topamax for migraine prophylaxis.    Orders:    topiramate (Topamax) 25 mg tablet; Take 1 tablet (25 mg total) by mouth 2 (two) times a day

## 2024-10-02 DIAGNOSIS — F41.9 ANXIETY: ICD-10-CM

## 2024-10-02 RX ORDER — ALPRAZOLAM 0.5 MG
0.5 TABLET ORAL 2 TIMES DAILY PRN
Qty: 30 TABLET | Refills: 0 | Status: SHIPPED | OUTPATIENT
Start: 2024-10-02

## 2024-10-26 DIAGNOSIS — F41.9 ANXIETY: ICD-10-CM

## 2024-10-26 DIAGNOSIS — G43.901 MIGRAINE WITH STATUS MIGRAINOSUS, NOT INTRACTABLE, UNSPECIFIED MIGRAINE TYPE: ICD-10-CM

## 2024-10-28 RX ORDER — SUMATRIPTAN 25 MG/1
25 TABLET, FILM COATED ORAL ONCE AS NEEDED
Qty: 30 TABLET | Refills: 2 | Status: SHIPPED | OUTPATIENT
Start: 2024-10-28

## 2024-10-28 RX ORDER — ONDANSETRON 4 MG/1
4 TABLET, ORALLY DISINTEGRATING ORAL EVERY 8 HOURS PRN
Qty: 20 TABLET | Refills: 0 | Status: SHIPPED | OUTPATIENT
Start: 2024-10-28

## 2024-10-28 RX ORDER — ALPRAZOLAM 0.5 MG
0.5 TABLET ORAL 2 TIMES DAILY PRN
Qty: 30 TABLET | Refills: 0 | Status: SHIPPED | OUTPATIENT
Start: 2024-10-28

## 2024-11-18 DIAGNOSIS — F41.9 ANXIETY: ICD-10-CM

## 2024-11-19 RX ORDER — SERTRALINE HYDROCHLORIDE 25 MG/1
25 TABLET, FILM COATED ORAL DAILY
Qty: 90 TABLET | Refills: 0 | OUTPATIENT
Start: 2024-11-19

## 2024-11-27 DIAGNOSIS — F41.9 ANXIETY: ICD-10-CM

## 2024-11-27 RX ORDER — ALPRAZOLAM 0.5 MG
0.5 TABLET ORAL 2 TIMES DAILY PRN
Qty: 30 TABLET | Refills: 0 | Status: SHIPPED | OUTPATIENT
Start: 2024-11-27

## 2024-12-26 DIAGNOSIS — F41.9 ANXIETY: ICD-10-CM

## 2024-12-26 RX ORDER — ALPRAZOLAM 0.5 MG
0.5 TABLET ORAL 2 TIMES DAILY PRN
Qty: 30 TABLET | Refills: 0 | Status: SHIPPED | OUTPATIENT
Start: 2024-12-26